# Patient Record
Sex: MALE | Race: WHITE | NOT HISPANIC OR LATINO | URBAN - METROPOLITAN AREA
[De-identification: names, ages, dates, MRNs, and addresses within clinical notes are randomized per-mention and may not be internally consistent; named-entity substitution may affect disease eponyms.]

---

## 2024-04-12 ENCOUNTER — HOSPITAL ENCOUNTER (INPATIENT)
Facility: HOSPITAL | Age: 59
LOS: 3 days | Discharge: HOME | DRG: 121 | End: 2024-04-15
Attending: EMERGENCY MEDICINE | Admitting: HOSPITALIST
Payer: MEDICAID

## 2024-04-12 DIAGNOSIS — R76.8 HEPATITIS C ANTIBODY POSITIVE IN BLOOD: ICD-10-CM

## 2024-04-12 DIAGNOSIS — R07.9 CHEST PAIN: ICD-10-CM

## 2024-04-12 DIAGNOSIS — H05.012 ORBITAL CELLULITIS ON LEFT: Primary | ICD-10-CM

## 2024-04-12 DIAGNOSIS — H05.012 ORBITAL ABSCESS, LEFT: ICD-10-CM

## 2024-04-12 PROBLEM — F17.210 CIGARETTE NICOTINE DEPENDENCE WITHOUT COMPLICATION: Status: ACTIVE | Noted: 2024-04-12

## 2024-04-12 PROBLEM — R79.89 ELEVATED LFTS: Status: ACTIVE | Noted: 2024-04-12

## 2024-04-12 LAB
ALBUMIN SERPL BCP-MCNC: 3.2 G/DL (ref 3.5–5.2)
ALP SERPL-CCNC: 53 U/L (ref 55–135)
ALT SERPL W/O P-5'-P-CCNC: 46 U/L (ref 10–44)
ANION GAP SERPL CALC-SCNC: 8 MMOL/L (ref 8–16)
AST SERPL-CCNC: 50 U/L (ref 10–40)
BASOPHILS # BLD AUTO: 0.07 K/UL (ref 0–0.2)
BASOPHILS NFR BLD: 0.6 % (ref 0–1.9)
BILIRUB SERPL-MCNC: 0.4 MG/DL (ref 0.1–1)
BUN SERPL-MCNC: 8 MG/DL (ref 6–20)
CALCIUM SERPL-MCNC: 9.1 MG/DL (ref 8.7–10.5)
CHLORIDE SERPL-SCNC: 109 MMOL/L (ref 95–110)
CHOLEST SERPL-MCNC: 159 MG/DL (ref 120–199)
CHOLEST/HDLC SERPL: 4.2 {RATIO} (ref 2–5)
CO2 SERPL-SCNC: 24 MMOL/L (ref 23–29)
CREAT SERPL-MCNC: 0.8 MG/DL (ref 0.5–1.4)
CRP SERPL-MCNC: 2.5 MG/L (ref 0–8.2)
DIFFERENTIAL METHOD BLD: ABNORMAL
EOSINOPHIL # BLD AUTO: 0.1 K/UL (ref 0–0.5)
EOSINOPHIL NFR BLD: 1.1 % (ref 0–8)
ERYTHROCYTE [DISTWIDTH] IN BLOOD BY AUTOMATED COUNT: 13.1 % (ref 11.5–14.5)
EST. GFR  (NO RACE VARIABLE): >60 ML/MIN/1.73 M^2
ESTIMATED AVG GLUCOSE: 108 MG/DL (ref 68–131)
GLUCOSE SERPL-MCNC: 122 MG/DL (ref 70–110)
HBA1C MFR BLD: 5.4 % (ref 4–5.6)
HCT VFR BLD AUTO: 42.8 % (ref 40–54)
HCV AB SERPL QL IA: REACTIVE
HDLC SERPL-MCNC: 38 MG/DL (ref 40–75)
HDLC SERPL: 23.9 % (ref 20–50)
HGB BLD-MCNC: 14.5 G/DL (ref 14–18)
HIV 1+2 AB+HIV1 P24 AG SERPL QL IA: NORMAL
IMM GRANULOCYTES # BLD AUTO: 0.02 K/UL (ref 0–0.04)
IMM GRANULOCYTES NFR BLD AUTO: 0.2 % (ref 0–0.5)
LDLC SERPL CALC-MCNC: 84 MG/DL (ref 63–159)
LYMPHOCYTES # BLD AUTO: 3.5 K/UL (ref 1–4.8)
LYMPHOCYTES NFR BLD: 29.9 % (ref 18–48)
MCH RBC QN AUTO: 32.7 PG (ref 27–31)
MCHC RBC AUTO-ENTMCNC: 33.9 G/DL (ref 32–36)
MCV RBC AUTO: 96 FL (ref 82–98)
MONOCYTES # BLD AUTO: 0.8 K/UL (ref 0.3–1)
MONOCYTES NFR BLD: 6.9 % (ref 4–15)
NEUTROPHILS # BLD AUTO: 7.2 K/UL (ref 1.8–7.7)
NEUTROPHILS NFR BLD: 61.3 % (ref 38–73)
NONHDLC SERPL-MCNC: 121 MG/DL
NRBC BLD-RTO: 0 /100 WBC
PLATELET # BLD AUTO: 249 K/UL (ref 150–450)
PMV BLD AUTO: 10.1 FL (ref 9.2–12.9)
POTASSIUM SERPL-SCNC: 3.8 MMOL/L (ref 3.5–5.1)
PROT SERPL-MCNC: 6.6 G/DL (ref 6–8.4)
RBC # BLD AUTO: 4.44 M/UL (ref 4.6–6.2)
SODIUM SERPL-SCNC: 141 MMOL/L (ref 136–145)
TRIGL SERPL-MCNC: 185 MG/DL (ref 30–150)
TSH SERPL DL<=0.005 MIU/L-ACNC: 0.58 UIU/ML (ref 0.4–4)
WBC # BLD AUTO: 11.82 K/UL (ref 3.9–12.7)

## 2024-04-12 PROCEDURE — 86803 HEPATITIS C AB TEST: CPT | Performed by: PHYSICIAN ASSISTANT

## 2024-04-12 PROCEDURE — 87522 HEPATITIS C REVRS TRNSCRPJ: CPT | Performed by: PHYSICIAN ASSISTANT

## 2024-04-12 PROCEDURE — 96374 THER/PROPH/DIAG INJ IV PUSH: CPT

## 2024-04-12 PROCEDURE — 99285 EMERGENCY DEPT VISIT HI MDM: CPT

## 2024-04-12 PROCEDURE — 84443 ASSAY THYROID STIM HORMONE: CPT | Performed by: PHYSICIAN ASSISTANT

## 2024-04-12 PROCEDURE — 12000002 HC ACUTE/MED SURGE SEMI-PRIVATE ROOM

## 2024-04-12 PROCEDURE — 83036 HEMOGLOBIN GLYCOSYLATED A1C: CPT | Performed by: PHYSICIAN ASSISTANT

## 2024-04-12 PROCEDURE — 87389 HIV-1 AG W/HIV-1&-2 AB AG IA: CPT | Performed by: PHYSICIAN ASSISTANT

## 2024-04-12 PROCEDURE — 85025 COMPLETE CBC W/AUTO DIFF WBC: CPT | Performed by: PHYSICIAN ASSISTANT

## 2024-04-12 PROCEDURE — 80061 LIPID PANEL: CPT | Performed by: PHYSICIAN ASSISTANT

## 2024-04-12 PROCEDURE — 25000003 PHARM REV CODE 250: Performed by: PHYSICIAN ASSISTANT

## 2024-04-12 PROCEDURE — 86140 C-REACTIVE PROTEIN: CPT | Performed by: PHYSICIAN ASSISTANT

## 2024-04-12 PROCEDURE — 63600175 PHARM REV CODE 636 W HCPCS: Performed by: PHYSICIAN ASSISTANT

## 2024-04-12 PROCEDURE — 80053 COMPREHEN METABOLIC PANEL: CPT | Performed by: PHYSICIAN ASSISTANT

## 2024-04-12 RX ORDER — SODIUM CHLORIDE 0.9 % (FLUSH) 0.9 %
10 SYRINGE (ML) INJECTION EVERY 12 HOURS PRN
Status: DISCONTINUED | OUTPATIENT
Start: 2024-04-12 | End: 2024-04-15 | Stop reason: HOSPADM

## 2024-04-12 RX ORDER — IBUPROFEN 200 MG
24 TABLET ORAL
Status: DISCONTINUED | OUTPATIENT
Start: 2024-04-12 | End: 2024-04-15 | Stop reason: HOSPADM

## 2024-04-12 RX ORDER — GLUCAGON 1 MG
1 KIT INJECTION
Status: DISCONTINUED | OUTPATIENT
Start: 2024-04-12 | End: 2024-04-15 | Stop reason: HOSPADM

## 2024-04-12 RX ORDER — ACETAMINOPHEN 325 MG/1
650 TABLET ORAL EVERY 6 HOURS PRN
Status: DISCONTINUED | OUTPATIENT
Start: 2024-04-12 | End: 2024-04-15 | Stop reason: HOSPADM

## 2024-04-12 RX ORDER — KETOROLAC TROMETHAMINE 30 MG/ML
10 INJECTION, SOLUTION INTRAMUSCULAR; INTRAVENOUS
Status: COMPLETED | OUTPATIENT
Start: 2024-04-12 | End: 2024-04-12

## 2024-04-12 RX ORDER — IBUPROFEN 200 MG
16 TABLET ORAL
Status: DISCONTINUED | OUTPATIENT
Start: 2024-04-12 | End: 2024-04-15 | Stop reason: HOSPADM

## 2024-04-12 RX ORDER — TALC
6 POWDER (GRAM) TOPICAL NIGHTLY PRN
Status: DISCONTINUED | OUTPATIENT
Start: 2024-04-12 | End: 2024-04-15 | Stop reason: HOSPADM

## 2024-04-12 RX ORDER — PROPARACAINE HYDROCHLORIDE 5 MG/ML
1 SOLUTION/ DROPS OPHTHALMIC
Status: COMPLETED | OUTPATIENT
Start: 2024-04-12 | End: 2024-04-12

## 2024-04-12 RX ORDER — ONDANSETRON HYDROCHLORIDE 2 MG/ML
4 INJECTION, SOLUTION INTRAVENOUS EVERY 8 HOURS PRN
Status: DISCONTINUED | OUTPATIENT
Start: 2024-04-12 | End: 2024-04-15 | Stop reason: HOSPADM

## 2024-04-12 RX ORDER — NALOXONE HCL 0.4 MG/ML
0.02 VIAL (ML) INJECTION
Status: DISCONTINUED | OUTPATIENT
Start: 2024-04-12 | End: 2024-04-15 | Stop reason: HOSPADM

## 2024-04-12 RX ORDER — SODIUM CHLORIDE 0.9 % (FLUSH) 0.9 %
10 SYRINGE (ML) INJECTION
Status: DISCONTINUED | OUTPATIENT
Start: 2024-04-12 | End: 2024-04-15 | Stop reason: HOSPADM

## 2024-04-12 RX ADMIN — KETOROLAC TROMETHAMINE 10 MG: 30 INJECTION, SOLUTION INTRAMUSCULAR; INTRAVENOUS at 08:04

## 2024-04-12 RX ADMIN — PROPARACAINE HYDROCHLORIDE 1 DROP: 5 SOLUTION/ DROPS OPHTHALMIC at 09:04

## 2024-04-13 LAB
ALBUMIN SERPL BCP-MCNC: 3.1 G/DL (ref 3.5–5.2)
ALP SERPL-CCNC: 51 U/L (ref 55–135)
ALT SERPL W/O P-5'-P-CCNC: 45 U/L (ref 10–44)
ANION GAP SERPL CALC-SCNC: 8 MMOL/L (ref 8–16)
AST SERPL-CCNC: 47 U/L (ref 10–40)
BASOPHILS # BLD AUTO: 0.06 K/UL (ref 0–0.2)
BASOPHILS NFR BLD: 0.6 % (ref 0–1.9)
BILIRUB SERPL-MCNC: 0.7 MG/DL (ref 0.1–1)
BILIRUB UR QL STRIP: NEGATIVE
BUN SERPL-MCNC: 8 MG/DL (ref 6–20)
CALCIUM SERPL-MCNC: 8.9 MG/DL (ref 8.7–10.5)
CHLORIDE SERPL-SCNC: 108 MMOL/L (ref 95–110)
CLARITY UR REFRACT.AUTO: CLEAR
CO2 SERPL-SCNC: 23 MMOL/L (ref 23–29)
COLOR UR AUTO: YELLOW
CREAT SERPL-MCNC: 0.8 MG/DL (ref 0.5–1.4)
DIFFERENTIAL METHOD BLD: ABNORMAL
EOSINOPHIL # BLD AUTO: 0.1 K/UL (ref 0–0.5)
EOSINOPHIL NFR BLD: 1.4 % (ref 0–8)
ERYTHROCYTE [DISTWIDTH] IN BLOOD BY AUTOMATED COUNT: 13.1 % (ref 11.5–14.5)
ERYTHROCYTE [SEDIMENTATION RATE] IN BLOOD BY PHOTOMETRIC METHOD: 21 MM/HR (ref 0–23)
EST. GFR  (NO RACE VARIABLE): >60 ML/MIN/1.73 M^2
GLUCOSE SERPL-MCNC: 76 MG/DL (ref 70–110)
GLUCOSE UR QL STRIP: NEGATIVE
HCT VFR BLD AUTO: 42.6 % (ref 40–54)
HGB BLD-MCNC: 14 G/DL (ref 14–18)
HGB UR QL STRIP: NEGATIVE
IMM GRANULOCYTES # BLD AUTO: 0.04 K/UL (ref 0–0.04)
IMM GRANULOCYTES NFR BLD AUTO: 0.4 % (ref 0–0.5)
KETONES UR QL STRIP: NEGATIVE
LEUKOCYTE ESTERASE UR QL STRIP: NEGATIVE
LYMPHOCYTES # BLD AUTO: 3.6 K/UL (ref 1–4.8)
LYMPHOCYTES NFR BLD: 36.3 % (ref 18–48)
MAGNESIUM SERPL-MCNC: 1.8 MG/DL (ref 1.6–2.6)
MCH RBC QN AUTO: 32.3 PG (ref 27–31)
MCHC RBC AUTO-ENTMCNC: 32.9 G/DL (ref 32–36)
MCV RBC AUTO: 98 FL (ref 82–98)
MONOCYTES # BLD AUTO: 0.8 K/UL (ref 0.3–1)
MONOCYTES NFR BLD: 8.3 % (ref 4–15)
NEUTROPHILS # BLD AUTO: 5.2 K/UL (ref 1.8–7.7)
NEUTROPHILS NFR BLD: 53 % (ref 38–73)
NITRITE UR QL STRIP: NEGATIVE
NRBC BLD-RTO: 0 /100 WBC
PH UR STRIP: 8 [PH] (ref 5–8)
PLATELET # BLD AUTO: 227 K/UL (ref 150–450)
PMV BLD AUTO: 10.5 FL (ref 9.2–12.9)
POTASSIUM SERPL-SCNC: 3.7 MMOL/L (ref 3.5–5.1)
PROCALCITONIN SERPL IA-MCNC: <0.02 NG/ML
PROT SERPL-MCNC: 6.3 G/DL (ref 6–8.4)
PROT UR QL STRIP: NEGATIVE
RBC # BLD AUTO: 4.34 M/UL (ref 4.6–6.2)
SODIUM SERPL-SCNC: 139 MMOL/L (ref 136–145)
SP GR UR STRIP: 1.02 (ref 1–1.03)
URN SPEC COLLECT METH UR: NORMAL
WBC # BLD AUTO: 9.88 K/UL (ref 3.9–12.7)

## 2024-04-13 PROCEDURE — 63600175 PHARM REV CODE 636 W HCPCS: Performed by: HOSPITALIST

## 2024-04-13 PROCEDURE — 63600175 PHARM REV CODE 636 W HCPCS: Performed by: INTERNAL MEDICINE

## 2024-04-13 PROCEDURE — 21400001 HC TELEMETRY ROOM

## 2024-04-13 PROCEDURE — 25000003 PHARM REV CODE 250: Performed by: NURSE PRACTITIONER

## 2024-04-13 PROCEDURE — 83735 ASSAY OF MAGNESIUM: CPT | Performed by: NURSE PRACTITIONER

## 2024-04-13 PROCEDURE — 84145 PROCALCITONIN (PCT): CPT | Performed by: NURSE PRACTITIONER

## 2024-04-13 PROCEDURE — 25000003 PHARM REV CODE 250: Performed by: INTERNAL MEDICINE

## 2024-04-13 PROCEDURE — 11000001 HC ACUTE MED/SURG PRIVATE ROOM

## 2024-04-13 PROCEDURE — 80053 COMPREHEN METABOLIC PANEL: CPT | Performed by: NURSE PRACTITIONER

## 2024-04-13 PROCEDURE — 63600175 PHARM REV CODE 636 W HCPCS: Performed by: NURSE PRACTITIONER

## 2024-04-13 PROCEDURE — 36415 COLL VENOUS BLD VENIPUNCTURE: CPT | Performed by: NURSE PRACTITIONER

## 2024-04-13 PROCEDURE — 81003 URINALYSIS AUTO W/O SCOPE: CPT | Performed by: NURSE PRACTITIONER

## 2024-04-13 PROCEDURE — 85025 COMPLETE CBC W/AUTO DIFF WBC: CPT | Performed by: NURSE PRACTITIONER

## 2024-04-13 PROCEDURE — 85652 RBC SED RATE AUTOMATED: CPT | Performed by: NURSE PRACTITIONER

## 2024-04-13 PROCEDURE — 25000003 PHARM REV CODE 250: Performed by: HOSPITALIST

## 2024-04-13 RX ADMIN — AMPICILLIN SODIUM, SULBACTAM SODIUM 3 G: 2; 1 INJECTION, POWDER, FOR SOLUTION INTRAMUSCULAR; INTRAVENOUS at 12:04

## 2024-04-13 RX ADMIN — VANCOMYCIN HYDROCHLORIDE 1250 MG: 1.25 INJECTION, POWDER, LYOPHILIZED, FOR SOLUTION INTRAVENOUS at 05:04

## 2024-04-13 RX ADMIN — AMPICILLIN SODIUM AND SULBACTAM SODIUM 3 G: 2; 1 INJECTION, POWDER, FOR SOLUTION INTRAMUSCULAR; INTRAVENOUS at 01:04

## 2024-04-13 RX ADMIN — IOHEXOL 75 ML: 350 INJECTION, SOLUTION INTRAVENOUS at 11:04

## 2024-04-13 RX ADMIN — SODIUM CHLORIDE 500 ML: 9 INJECTION, SOLUTION INTRAVENOUS at 12:04

## 2024-04-13 RX ADMIN — VANCOMYCIN HYDROCHLORIDE 1250 MG: 1.25 INJECTION, POWDER, LYOPHILIZED, FOR SOLUTION INTRAVENOUS at 07:04

## 2024-04-13 NOTE — HPI
Adin Copeland is a 58 y.o. male with a PMHx of nicotine dependence who presents to the ED as a transfer from [facility] for ophthalmology evaluation for orbital cellulitis with abscess. Labs largely unremarkable CT orbits w/ contrast revealed findings consistent with left orbital cellulitis with a 10 x 4 mm fluid collection anterior to the left lobe suspicious for an abscess. There is no post septal extension. The patient received 1.5g vancomycin and 2g Rocephin prior to transfer. The patient reports he noted mild upper eyelid pain 3 days ago. He took ASA and went to bed. He reports the next morning he noted left eye redness, swelling, and drainage. He reports it worsened today. Denies any trauam to the eye. He denies vision changes, headache, fever, or chills. He denies SOB, CP, N/V/D, abdominal pain, or dysuria. The patient endorses chronic smoker's cough that is unchanged from baseline.    In the ED, VSS, afebrile. CBC unremarkable. Glucose 122. Albumin 3.2. AST/ALT 50/46. CRP 2.5. Hep C Ab reactive. The patient received Toradol. Ophthalmology evaluated the patient and recommended vancomycin and unasyn, repeat CT orbits w/wo contrast tomorrow, and ENT evaluation for possible abscess drainage.

## 2024-04-13 NOTE — CONSULTS
Consultation Report  Ophthalmology Service    Date: 04/12/2024    Chief complaint/Reason for Consult: maisha-orbital cellulitis     History of Present Illness: Adin Copeland is a 58 y.o. male who presents with 3 days of worsening left eye swelling. Started 2 days ago, and has gotten progressively worse. Minimal pain, and minimal tenderness. Denies vision changes, changes with extra-ocular movements, diplopia, fevers chills. Presented to [facility] because was worried about swelling.    POcularHx: No history of ocular problems or past ocular surgeries.  Does not use glasses or contacts.    Current eye gtts: none      PMHx:  has no past medical history on file.     PSurgHx:  has no past surgical history on file.     Home Medications:   Prior to Admission medications    Not on File        Medications this encounter:     Allergies: has No Known Allergies.     Social:       Family Hx: No family history of glaucoma, macular degeneration, or blindness. family history is not on file.     ROS: see hpi     Ocular examination/Dilated fundus examination:  Base Eye Exam       Visual Acuity (Snellen - Linear)         Right Left    Dist sc 20/40 20/20              Tonometry (Tonopen, 10:15 PM)         Right Left    Pressure 14 15              Pupils         Pupils Dark Light Shape React APD    Right PERRL 4 2 Round Brisk None    Left PERRL 4 2 Round Brisk None              Visual Fields         Right Left     Full Full              Extraocular Movement         Right Left     Full, Ortho Full, Ortho              Dilation       Both eyes: 1% Mydriacyl, 2.5% Phenylephrine @ 10:16 PM                  Slit Lamp and Fundus Exam       External Exam         Right Left    External Normal periorbital swelling, no fluctuant areas, no proptosis              Slit Lamp Exam         Right Left    Lids/Lashes Normal upper and lower lid swelling    Conjunctiva/Sclera White and quiet 2+ inferior and temporal serous chemosis    Cornea Clear Clear     Anterior Chamber Deep and quiet Deep and quiet    Iris Round and reactive Round and reactive    Lens Clear Clear    Anterior Vitreous Normal Normal              Fundus Exam         Right Left    Disc Normal Normal    C/D Ratio .2 .2    Macula Normal Normal    Vessels Normal Normal    Periphery Normal Normal                            =======================================    Assessment/Plan:   1. Maisha-orbital cellulitis with concern for lateral orbital abscess  - presents with three days of worsening left maisha-orbital swelling  - minimal pain, no proptosis, EOM full, normal vision and IOP, no rAPD  - CT scan from outside hospital concerning for abscess lateral to globe, but no read sent with scan  - no fluctuance felt at bedside, unable to access abscess at bedside  - recommend repeat CT orbits w and w/o contrast tomorrow morning  - recommend admit to medicine for IV vancomycin and unasyn  - no oculo-plastics staff available at this time to drain abscess, recommend ENT consult in the morning for possible abscess drainage  - ophthalmology will continue to follow    Discussed patient's history, physical, assessment and plan with Dr. Paz.  If there are further questions, please page the on call ophthalmology resident.    Franco Evangelista MD  PGY-2, Ophthalmology  04/12/2024  10:16 PM

## 2024-04-13 NOTE — ED TRIAGE NOTES
Pt presents to ED from [facility] for optho consult. Pt arrives with abscess to left eye x3 days. Pt denies pain or vision changes at this time.

## 2024-04-13 NOTE — NURSING
Patient resting in bed, AAOx4, no complaints of pain, respirations even and unlabored. Plan of care and medications discussed with patient; patient verbalized understanding. Care ongoing.

## 2024-04-13 NOTE — ASSESSMENT & PLAN NOTE
-Afebrile, no leukocytosis.  -CRP 2.5.  -CT orbits w/ contrast at OSH reveals findings consistent with left orbital cellulitis with a 10 x 4 mm fluid collection anterior to the left lobe suspicious for an abscess. There is no post septal extension.   -The patient received vancomycin and rocephin prior to transfer, will change to unasyn and vancomycin per ophthalmology recs.  -Repeat CT orbits w/ contrast tomorrow per ophthalmology recs.  -ENT consulted per ophthalmology recs as there is no oculo-plastics staff available at this time to drain abscess, appreciate assistance.  -NPO after midnight pending ENT evaluation.  -PRN pain control.

## 2024-04-13 NOTE — PROGRESS NOTES
[facility] ([address] Medicine  Progress Note    Patient Name: Adin Copeland  MRN: [id number]  Patient Class: IP- Inpatient   Admission Date: 4/12/2024  Length of Stay: 1 days  Attending Physician: Tereso Patel MD  Primary Care Provider: Delia, Primary Doctor        Subjective:     Principal Problem:Orbital cellulitis on left        HPI:  Adin Copeland is a 58 y.o. male with a PMHx of nicotine dependence who presents to the ED as a transfer from [facility] for ophthalmology evaluation for orbital cellulitis with abscess. Labs largely unremarkable CT orbits w/ contrast revealed findings consistent with left orbital cellulitis with a 10 x 4 mm fluid collection anterior to the left lobe suspicious for an abscess. There is no post septal extension. The patient received 1.5g vancomycin and 2g Rocephin prior to transfer. The patient reports he noted mild upper eyelid pain 3 days ago. He took ASA and went to bed. He reports the next morning he noted left eye redness, swelling, and drainage. He reports it worsened today. Denies any trauam to the eye. He denies vision changes, headache, fever, or chills. He denies SOB, CP, N/V/D, abdominal pain, or dysuria. The patient endorses chronic smoker's cough that is unchanged from baseline.    In the ED, VSS, afebrile. CBC unremarkable. Glucose 122. Albumin 3.2. AST/ALT 50/46. CRP 2.5. Hep C Ab reactive. The patient received Toradol. Ophthalmology evaluated the patient and recommended vancomycin and unasyn, repeat CT orbits w/wo contrast tomorrow, and ENT evaluation for possible abscess drainage.    Overview/Hospital Course:  No notes on file    Interval History: Cellulitis much improved, probable dc to home in am..    Review of Systems  Objective:     Vital Signs (Most Recent):  Temp: 98.5 °F (36.9 °C) (04/13/24 0815)  Pulse: (!) 53 (04/13/24 0815)  Resp: 16 (04/13/24 0815)  BP: 116/72 (04/13/24 0815)  SpO2: 95 % (04/13/24 0815) Vital Signs (24h Range):  Temp:   [97.6 °F (36.4 °C)-98.5 °F (36.9 °C)] 98.5 °F (36.9 °C)  Pulse:  [53-65] 53  Resp:  [14-23] 16  SpO2:  [95 %-98 %] 95 %  BP: (116-148)/(72-90) 116/72     Weight: 78.2 kg (172 lb 6.4 oz)  Body mass index is 21.55 kg/m².    Intake/Output Summary (Last 24 hours) at 4/13/2024 1008  Last data filed at 4/13/2024 0546  Gross per 24 hour   Intake 599 ml   Output --   Net 599 ml         Physical Exam  Vitals and nursing note reviewed.   Constitutional:       General: He is not in acute distress.     Appearance: He is not toxic-appearing or diaphoretic.   HENT:      Head: Normocephalic and atraumatic. Left periorbital erythema present.      Nose: Nose normal.      Mouth/Throat:      Mouth: Mucous membranes are moist.   Eyes:      General:         Left eye: Discharge present.     Extraocular Movements: Extraocular movements intact.      Conjunctiva/sclera:      Left eye: Left conjunctiva is injected. Chemosis present.      Pupils: Pupils are equal, round, and reactive to light.      Comments: Erythema and swelling of the upper and lower lids with chemosis. No significant pain with extraocular movements. See photos   Cardiovascular:      Rate and Rhythm: Normal rate and regular rhythm.      Pulses: Normal pulses.   Pulmonary:      Effort: Pulmonary effort is normal. No respiratory distress.      Breath sounds: No wheezing, rhonchi or rales.   Abdominal:      General: Bowel sounds are normal. There is no distension.      Palpations: Abdomen is soft.      Tenderness: There is no abdominal tenderness. There is no guarding.   Musculoskeletal:         General: Normal range of motion.      Cervical back: Normal range of motion.   Skin:     General: Skin is warm and dry.      Capillary Refill: Capillary refill takes less than 2 seconds.   Neurological:      General: No focal deficit present.      Mental Status: He is alert and oriented to person, place, and time.      Sensory: No sensory deficit.      Motor: No weakness.    Psychiatric:         Mood and Affect: Mood normal.         Behavior: Behavior normal.             Significant Labs: All pertinent labs within the past 24 hours have been reviewed.  BMP:   Recent Labs   Lab 04/13/24  0419   GLU 76      K 3.7      CO2 23   BUN 8   CREATININE 0.8   CALCIUM 8.9   MG 1.8       Significant Imaging: I have reviewed all pertinent imaging results/findings within the past 24 hours.    Assessment/Plan:      * Orbital cellulitis on left  -Afebrile, no leukocytosis.  -CRP 2.5.  -CT orbits w/ contrast at OSH reveals findings consistent with left orbital cellulitis with a 10 x 4 mm fluid collection anterior to the left lobe suspicious for an abscess. There is no post septal extension.   -The patient received vancomycin and rocephin prior to transfer, will change to unasyn and vancomycin per ophthalmology recs.  -Repeat CT orbits w/ contrast tomorrow per ophthalmology recs.  -ENT consulted per ophthalmology recs as there is no oculo-plastics staff available at this time to drain abscess, appreciate assistance.  -NPO after midnight pending ENT evaluation.  -PRN pain control.    Elevated LFTs  -AST/ALT 50/46 on admit, Tbili 0.4.  -Hep C Ab reactive.   -RUQ abd US pending.  -Continue to monitor on daily labs.    Hepatitis C antibody test positive  -Hep C Ab reactive on admit, Quantitative PCR in process.  -Pending results patient may need outpatient hepatology follow up, which I discussed with the patient.    Cigarette nicotine dependence without complication  Dangers of cigarette smoking were reviewed with patient in detail. Patient was Counseled for 3-10 minutes. Nicotine replacement options were discussed. Nicotine replacement was discussed- not prescribed per patient's request      VTE Risk Mitigation (From admission, onward)           Ordered     IP VTE LOW RISK PATIENT  Once         04/13/24 0247     Place sequential compression device  Until discontinued         04/12/24 7711                     Discharge Planning   CLYDE:      Code Status: Full Code   Is the patient medically ready for discharge?:     Reason for patient still in hospital (select all that apply): Patient unstable                     Tereso Patel MD  Department of Hospital Medicine   [facility] ([place])

## 2024-04-13 NOTE — CONSULTS
[facility] ([place] [address])  Otorhinolaryngology-Head & Neck Surgery  Consult Note    Patient Name: Adin Copeland  MRN: [id number]  Code Status: Full Code  Admission Date: 4/12/2024  Hospital Length of Stay: 1 days  Attending Physician: Tereso Patel MD  Primary Care Provider: No primary care provider on file.    Patient information was obtained from patient, past medical records, and ER records.     Inpatient consult to ENT  Consult performed by: Dyllan Watts MD  Consult ordered by: Marysol Packer NP        Subjective:     Chief Complaint/Reason for Admission: left periorbital edema    History of Present Illness: 58M presented to [facility] ED last night with 3 days of progressive left eye swelling. CT scan was performed with showed periorbital cellulitis and possible small lateral orbital abscess. Per chart review, the ED discussed the case with ophthalmology team at [facility], who accepted ED to ED transfer for ophthalmology evaluation. Ophthalmology assessed the patient last night, recommended repeat scan and unasyn/vanc. Patient is admitted to the  service. WBC normal.     On my exam this morning, patient reports improvement of left periorbital edema and pain. Denies vision changes. Denies rhinorrhea. Denies trauma to left eye or bug bites, etc. However, he does have cats in his apartment, so possible he got scratched while sleeping.     Medications:  Continuous Infusions:  Current Facility-Administered Medications   Medication Dose Route Frequency Provider Last Rate Last Admin    acetaminophen tablet 650 mg  650 mg Oral Q6H PRN Marysol Packer NP        ampicillin-sulbactam (UNASYN) 3 g in sodium chloride 0.9 % 100 mL IVPB (MB+)  3 g Intravenous Q8H Marysol Packer NP   Stopped at 04/13/24 0101    dextrose 10% bolus 125 mL 125 mL  12.5 g Intravenous PRN Marysol Packer NP        dextrose 10% bolus 250 mL 250 mL  25 g Intravenous PRN Marysol Packer NP        glucagon (human  recombinant) injection 1 mg  1 mg Intramuscular PRN Marysol Packer, NP        glucose chewable tablet 16 g  16 g Oral PRN Marysol Packer, NP        glucose chewable tablet 24 g  24 g Oral PRN Marysol Packer, NP        melatonin tablet 6 mg  6 mg Oral Nightly PRN Carosone-Link, Mary, PA-C        naloxone 0.4 mg/mL injection 0.02 mg  0.02 mg Intravenous PRN Marysol Packer, ROMERO        ondansetron injection 4 mg  4 mg Intravenous Q8H PRN Marysol Packer, NP        sodium chloride 0.9% flush 10 mL  10 mL Intravenous PRN Carosone-Link, Mary, PA-C        sodium chloride 0.9% flush 10 mL  10 mL Intravenous Q12H PRN Marysol Packer, ROMERO        vancomycin - pharmacy to dose   Intravenous pharmacy to manage frequency Marysol Packer NP        vancomycin 1,250 mg in dextrose 5 % (D5W) 250 mL IVPB (Vial-Mate)  15 mg/kg Intravenous Q12H Ramon Paz MD         Scheduled Meds:  Current Facility-Administered Medications   Medication Dose Route Frequency Provider Last Rate Last Admin    acetaminophen tablet 650 mg  650 mg Oral Q6H PRN Marysol Packer NP        ampicillin-sulbactam (UNASYN) 3 g in sodium chloride 0.9 % 100 mL IVPB (MB+)  3 g Intravenous Q8H Marysol Packer NP   Stopped at 04/13/24 0101    dextrose 10% bolus 125 mL 125 mL  12.5 g Intravenous PRN Marysol Packer NP        dextrose 10% bolus 250 mL 250 mL  25 g Intravenous PRN Marysol Packer, NP        glucagon (human recombinant) injection 1 mg  1 mg Intramuscular PRN Marysol Packer, NP        glucose chewable tablet 16 g  16 g Oral PRN Marysol Packer, NP        glucose chewable tablet 24 g  24 g Oral PRN Marysol Packer, NP        melatonin tablet 6 mg  6 mg Oral Nightly PRN Carosone-Link, Mary, PA-C        naloxone 0.4 mg/mL injection 0.02 mg  0.02 mg Intravenous PRN Marysol Packer, ROMERO        ondansetron injection 4 mg  4 mg Intravenous Q8H PRN Marysol Packer, NP        sodium chloride 0.9% flush 10 mL   10 mL Intravenous PRN Carosone-Link, Mary, PA-C        sodium chloride 0.9% flush 10 mL  10 mL Intravenous Q12H PRN Marysol Packer NP        vancomycin - pharmacy to dose   Intravenous pharmacy to manage frequency Marysol Packer NP        vancomycin 1,250 mg in dextrose 5 % (D5W) 250 mL IVPB (Vial-Mate)  15 mg/kg Intravenous Q12H Ramon Paz MD         PRN Meds:  Current Facility-Administered Medications   Medication Dose Route Frequency Provider Last Rate Last Admin    acetaminophen tablet 650 mg  650 mg Oral Q6H PRN Marysol Packer NP        ampicillin-sulbactam (UNASYN) 3 g in sodium chloride 0.9 % 100 mL IVPB (MB+)  3 g Intravenous Q8H Marysol Packer NP   Stopped at 04/13/24 0101    dextrose 10% bolus 125 mL 125 mL  12.5 g Intravenous PRN Marysol Packer NP        dextrose 10% bolus 250 mL 250 mL  25 g Intravenous PRN Marysol Packer NP        glucagon (human recombinant) injection 1 mg  1 mg Intramuscular PRN Marysol Packer NP        glucose chewable tablet 16 g  16 g Oral PRN Marysol Packer NP        glucose chewable tablet 24 g  24 g Oral PRN Marysol Packer NP        melatonin tablet 6 mg  6 mg Oral Nightly PRN Carosone-Link, Mary, PA-C        naloxone 0.4 mg/mL injection 0.02 mg  0.02 mg Intravenous PRN Marysol Packer NP        ondansetron injection 4 mg  4 mg Intravenous Q8H PRN Marysol Packer NP        sodium chloride 0.9% flush 10 mL  10 mL Intravenous PRN Carosone-Link, Mary, PA-C        sodium chloride 0.9% flush 10 mL  10 mL Intravenous Q12H PRN Marysol Packer NP        vancomycin - pharmacy to dose   Intravenous pharmacy to manage frequency Marysol Packer NP        vancomycin 1,250 mg in dextrose 5 % (D5W) 250 mL IVPB (Vial-Mate)  15 mg/kg Intravenous Q12H Ramon Paz MD            Current Facility-Administered Medications   Medication Dose Route Frequency Provider Last Rate Last Admin    acetaminophen tablet 650 mg  650 mg  Oral Q6H PRN Marysol Packer NP        ampicillin-sulbactam (UNASYN) 3 g in sodium chloride 0.9 % 100 mL IVPB (MB+)  3 g Intravenous Q8H Marysol Packer NP   Stopped at 04/13/24 0101    dextrose 10% bolus 125 mL 125 mL  12.5 g Intravenous PRN Marysol Packer NP        dextrose 10% bolus 250 mL 250 mL  25 g Intravenous PRN Marysol Packer NP        glucagon (human recombinant) injection 1 mg  1 mg Intramuscular PRN Marysol Packer NP        glucose chewable tablet 16 g  16 g Oral PRN Marysol Packer NP        glucose chewable tablet 24 g  24 g Oral PRN Marysol Packer NP        melatonin tablet 6 mg  6 mg Oral Nightly PRN Carosone-LinkMary PA-C        naloxone 0.4 mg/mL injection 0.02 mg  0.02 mg Intravenous PRN Marysol Packer NP        ondansetron injection 4 mg  4 mg Intravenous Q8H PRN Marysol Packer NP        sodium chloride 0.9% flush 10 mL  10 mL Intravenous PRN Carosone-Link, Mary PA-C        sodium chloride 0.9% flush 10 mL  10 mL Intravenous Q12H PRN Marysol Packer NP        vancomycin - pharmacy to dose   Intravenous pharmacy to manage frequency Marysol Packer NP        vancomycin 1,250 mg in dextrose 5 % (D5W) 250 mL IVPB (Vial-Mate)  15 mg/kg Intravenous Q12H Ramon Paz MD           Review of patient's allergies indicates:  No Known Allergies    No past medical history on file.  Past Surgical History:   Procedure Laterality Date    HERNIA REPAIR      TONSILLECTOMY       Family History    None       Tobacco Use    Smoking status: Every Day     Current packs/day: 1.00     Average packs/day: 1 pack/day for 42.3 years (42.3 ttl pk-yrs)     Types: Cigarettes     Start date: 1982    Smokeless tobacco: Not on file   Substance and Sexual Activity    Alcohol use: Not Currently    Drug use: Never    Sexual activity: Not on file     Review of Systems  Objective:     Vital Signs (Most Recent):  Temp: 98 °F (36.7 °C) (04/13/24 0517)  Pulse: (!) 57 (04/13/24  0517)  Resp: 19 (04/13/24 0517)  BP: 126/79 (04/13/24 0517)  SpO2: 96 % (04/13/24 0517) Vital Signs (24h Range):  Temp:  [97.6 °F (36.4 °C)-98.3 °F (36.8 °C)] 98 °F (36.7 °C)  Pulse:  [57-65] 57  Resp:  [14-23] 19  SpO2:  [95 %-98 %] 96 %  BP: (120-148)/(79-90) 126/79     Weight: 78.2 kg (172 lb 6.4 oz)  Body mass index is 21.55 kg/m².         Physical Exam     NAD  Awake and alert  Head atraumatic   Auricles WNL AU  Eyes  -Right: No edema, EOMI, VA grossly intact, pupil reactive  -Left: Upper/lower lid edema, periorbital edema and erythema, minimally TTP, no palpable fluctuance, inferior and lateral chemosis, EOMI  Nose w/ normal external appearance, no rhinorrhea  MMM, anterior tongue mobile, FOM soft, OP patent w/ midline uvula   Neck soft, not TTP, normal ROM, no LAD  Normal WOB, no stridor or stertor    Significant Labs:  CBC:   Recent Labs   Lab 04/13/24  0419   WBC 9.88   RBC 4.34*   HGB 14.0   HCT 42.6      MCV 98   MCH 32.3*   MCHC 32.9       Significant Diagnostics:  CT: I have reviewed all pertinent results/findings within the past 24 hours and my personal findings are:  I reviewed images from [facility] EMR - left orbital cellulitis with a small abscess that is antererio/medial to the lateral canthus    Assessment/Plan:     * Orbital cellulitis on left  58M with left orbital cellulitis and small abscess just medial to lateral canthus based on images from outside system. Patient reports improvement with antibiotics and edema appears decreased based on photos in chart. Denies vision changes.     -no acute ENT intervention at this time  -ok for diet  -Abx per ophtho  -Fu repeat scan      VTE Risk Mitigation (From admission, onward)           Ordered     IP VTE LOW RISK PATIENT  Once         04/13/24 0247     Place sequential compression device  Until discontinued         04/12/24 6799                    Thank you for your consult. I will follow-up with patient. Please contact us if you have any additional  questions.    Dyllan Watts MD  Otorhinolaryngology-Head & Neck Surgery  [facility] ([place])

## 2024-04-13 NOTE — ASSESSMENT & PLAN NOTE
-AST/ALT 50/46 on admit, Tbili 0.4.  -Hep C Ab reactive.   -RUQ abd US pending.  -Continue to monitor on daily labs.

## 2024-04-13 NOTE — PROGRESS NOTES
Pharmacokinetic Initial Assessment: IV Vancomycin    Assessment/Plan:    Patient received intravenous vancomycin with loading dose of 1500 mg once at transferring hospital. Will be followed by a maintenance dose of vancomycin 1250 mg IV every 12 hours.    Desired empiric serum trough concentration is 10 to 20 mcg/mL.    Draw vancomycin trough level 60 min prior to fourth dose on 04/14/24 at approximately 05:00    Pharmacy will continue to follow and monitor vancomycin.      Please contact pharmacy at extension [phone] with any questions regarding this assessment.     Thank you for the consult,   Lui Staley       Patient brief summary:  Adin Copeland is a 58 y.o. male initiated on antimicrobial therapy with IV Vancomycin for treatment of suspected skin & soft tissue infection    Drug Allergies:   Review of patient's allergies indicates:  No Known Allergies    Actual Body Weight:   77.1 kg    Renal Function:   CrCl cannot be calculated (Unknown ideal weight.).,     Dialysis Method (if applicable):  N/A

## 2024-04-13 NOTE — SUBJECTIVE & OBJECTIVE
Interval History: Cellulitis much improved, probable dc to home in am..    Review of Systems  Objective:     Vital Signs (Most Recent):  Temp: 98.5 °F (36.9 °C) (04/13/24 0815)  Pulse: (!) 53 (04/13/24 0815)  Resp: 16 (04/13/24 0815)  BP: 116/72 (04/13/24 0815)  SpO2: 95 % (04/13/24 0815) Vital Signs (24h Range):  Temp:  [97.6 °F (36.4 °C)-98.5 °F (36.9 °C)] 98.5 °F (36.9 °C)  Pulse:  [53-65] 53  Resp:  [14-23] 16  SpO2:  [95 %-98 %] 95 %  BP: (116-148)/(72-90) 116/72     Weight: 78.2 kg (172 lb 6.4 oz)  Body mass index is 21.55 kg/m².    Intake/Output Summary (Last 24 hours) at 4/13/2024 1008  Last data filed at 4/13/2024 0546  Gross per 24 hour   Intake 599 ml   Output --   Net 599 ml         Physical Exam  Vitals and nursing note reviewed.   Constitutional:       General: He is not in acute distress.     Appearance: He is not toxic-appearing or diaphoretic.   HENT:      Head: Normocephalic and atraumatic. Left periorbital erythema present.      Nose: Nose normal.      Mouth/Throat:      Mouth: Mucous membranes are moist.   Eyes:      General:         Left eye: Discharge present.     Extraocular Movements: Extraocular movements intact.      Conjunctiva/sclera:      Left eye: Left conjunctiva is injected. Chemosis present.      Pupils: Pupils are equal, round, and reactive to light.      Comments: Erythema and swelling of the upper and lower lids with chemosis. No significant pain with extraocular movements. See photos   Cardiovascular:      Rate and Rhythm: Normal rate and regular rhythm.      Pulses: Normal pulses.   Pulmonary:      Effort: Pulmonary effort is normal. No respiratory distress.      Breath sounds: No wheezing, rhonchi or rales.   Abdominal:      General: Bowel sounds are normal. There is no distension.      Palpations: Abdomen is soft.      Tenderness: There is no abdominal tenderness. There is no guarding.   Musculoskeletal:         General: Normal range of motion.      Cervical back: Normal range  of motion.   Skin:     General: Skin is warm and dry.      Capillary Refill: Capillary refill takes less than 2 seconds.   Neurological:      General: No focal deficit present.      Mental Status: He is alert and oriented to person, place, and time.      Sensory: No sensory deficit.      Motor: No weakness.   Psychiatric:         Mood and Affect: Mood normal.         Behavior: Behavior normal.             Significant Labs: All pertinent labs within the past 24 hours have been reviewed.  BMP:   Recent Labs   Lab 04/13/24  0419   GLU 76      K 3.7      CO2 23   BUN 8   CREATININE 0.8   CALCIUM 8.9   MG 1.8       Significant Imaging: I have reviewed all pertinent imaging results/findings within the past 24 hours.

## 2024-04-13 NOTE — PROGRESS NOTES
progress Report  Ophthalmology Service    Date: 04/13/2024    Chief complaint/Reason for Consult: maisha-orbital cellulitis     History of Present Illness: Adin Copeland is a 58 y.o. male who presents with 3 days of worsening left eye swelling. Started 2 days ago, and has gotten progressively worse. Minimal pain, and minimal tenderness. Denies vision changes, changes with extra-ocular movements, diplopia, fevers chills. Presented to [facility] ER because was worried about swelling.    Interval History:  4/13/24: improved swelling per patient. No pain, no vision changes.     POcularHx: No history of ocular problems or past ocular surgeries.  Does not use glasses or contacts.    Current eye gtts: none      PMHx:  has no past medical history on file.     PSurgHx:  has a past surgical history that includes Tonsillectomy and Hernia repair.     Home Medications:   Prior to Admission medications    Not on File        Medications this encounter:     Allergies: has No Known Allergies.     Social:  reports that he has been smoking cigarettes. He started smoking about 42 years ago. He has a 42.3 pack-year smoking history. He does not have any smokeless tobacco history on file. He reports that he does not currently use alcohol. He reports that he does not use drugs.     Family Hx: No family history of glaucoma, macular degeneration, or blindness. family history is not on file.     ROS: see hpi     Ocular examination/Dilated fundus examination:  Base Eye Exam       Visual Acuity (Snellen - Linear)         Right Left    Dist sc 20/40 20/30              Tonometry (Tonopen, 3:26 PM)         Right Left    Pressure 14 15              Pupils         Dark Light Shape React APD    Right 3 2 Round Brisk None    Left 3 2 Round Brisk None              Extraocular Movement         Right Left     Full, Ortho Full, Ortho                  Slit Lamp and Fundus Exam       External Exam         Right Left    External Normal periorbital swelling, no  fluctuant areas, no proptosis              Slit Lamp Exam         Right Left    Lids/Lashes Normal Improved upper and lower lid swelling    Conjunctiva/Sclera White and quiet 1+ inferior and temporal serous chemosis    Cornea Clear Clear    Anterior Chamber Deep and quiet Deep and quiet    Iris Round and reactive Round and reactive    Lens Clear Clear    Anterior Vitreous Normal Normal                            =======================================    Assessment/Plan:   1. Maisha-orbital cellulitis with lateral orbital abscess  - presents with three days of worsening left maisha-orbital swelling  - minimal pain, no proptosis, EOM full, normal vision and IOP, no rAPD  - CT scan from outside hospital concerning for abscess lateral to globe, but no read sent with scan  - no fluctuance felt at bedside, unable to access abscess at bedside  - recommend repeat CT orbits w and w/o contrast tomorrow morning  - recommend admit to medicine for IV vancomycin and unasyn  - no oculo-plastics staff available at this time to drain abscess, Ent consulted for possible abscess drainage, who recommended no intervention at this time  - ophthalmology will continue to follow  - 4/13/24: improving on IV antibiotics, continue for 48-72 hours. Repeat CT scan scheduled for today.       Franco Evangelista MD  PGY-2, Ophthalmology  04/13/2024  10:16 PM

## 2024-04-13 NOTE — H&P
[facility] - Emergency Dept  [facility]  History & Physical    Patient Name: Adin Copeland  MRN: [id number]  Patient Class: IP- Inpatient  Admission Date: 4/12/2024  Attending Physician: Ramon Paz MD   Primary Care Provider: No primary care provider on file.         Patient information was obtained from patient, past medical records, and ER records.     Subjective:     Principal Problem:Orbital cellulitis on left    Chief Complaint:   Chief Complaint   Patient presents with    Abscess     From [facility], here for abscess on L eye and optho consult.        HPI: Adin Copeland is a 58 y.o. male with a PMHx of nicotine dependence who presents to the ED as a transfer from [facility] ED for ophthalmology evaluation for orbital cellulitis with abscess. Labs largely unremarkable CT orbits w/ contrast revealed findings consistent with left orbital cellulitis with a 10 x 4 mm fluid collection anterior to the left lobe suspicious for an abscess. There is no post septal extension. The patient received 1.5g vancomycin and 2g Rocephin prior to transfer. The patient reports he noted mild upper eyelid pain 3 days ago. He took ASA and went to bed. He reports the next morning he noted left eye redness, swelling, and drainage. He reports it worsened today. Denies any trauam to the eye. He denies vision changes, headache, fever, or chills. He denies SOB, CP, N/V/D, abdominal pain, or dysuria. The patient endorses chronic smoker's cough that is unchanged from baseline.    In the ED, VSS, afebrile. CBC unremarkable. Glucose 122. Albumin 3.2. AST/ALT 50/46. CRP 2.5. Hep C Ab reactive. The patient received Toradol. Ophthalmology evaluated the patient and recommended vancomycin and unasyn, repeat CT orbits w/wo contrast tomorrow, and ENT evaluation for possible abscess drainage.    History reviewed. No pertinent past medical history.    History reviewed. No pertinent surgical history.    Review of patient's allergies indicates:  No Known  Allergies    No current facility-administered medications on file prior to encounter.     No current outpatient medications on file prior to encounter.     Family History    None       Tobacco Use    Smoking status: Not on file    Smokeless tobacco: Not on file   Substance and Sexual Activity    Alcohol use: Not on file    Drug use: Not on file    Sexual activity: Not on file     Review of Systems   Constitutional:  Negative for appetite change, chills, diaphoresis, fatigue and fever.   HENT:  Negative for postnasal drip, rhinorrhea and sore throat.    Eyes:  Positive for pain, discharge and redness. Negative for photophobia and visual disturbance.   Respiratory:  Positive for cough. Negative for shortness of breath and wheezing.    Cardiovascular:  Negative for chest pain, palpitations and leg swelling.   Gastrointestinal:  Negative for abdominal distention, abdominal pain, diarrhea and nausea.   Genitourinary:  Negative for dysuria, frequency and hematuria.   Musculoskeletal:  Negative for back pain, myalgias and neck pain.   Skin:  Positive for color change. Negative for pallor, rash and wound.   Neurological:  Negative for syncope, weakness, light-headedness and headaches.   Psychiatric/Behavioral:  Negative for confusion and hallucinations. The patient is not nervous/anxious.      Objective:     Vital Signs (Most Recent):  Temp: 98.3 °F (36.8 °C) (04/12/24 2014)  Pulse: 60 (04/12/24 2014)  Resp: 14 (04/12/24 2156)  BP: (!) 120/90 (04/12/24 2014)  SpO2: 97 % (04/12/24 2014) Vital Signs (24h Range):  Temp:  [97.6 °F (36.4 °C)-98.3 °F (36.8 °C)] 98.3 °F (36.8 °C)  Pulse:  [60-62] 60  Resp:  [14-23] 14  SpO2:  [97 %] 97 %  BP: (120-148)/(90) 120/90     Weight: 77.1 kg (170 lb)  There is no height or weight on file to calculate BMI.     Physical Exam  Vitals and nursing note reviewed.   Constitutional:       General: He is not in acute distress.     Appearance: He is not toxic-appearing or diaphoretic.   HENT:       Head: Normocephalic and atraumatic. Left periorbital erythema present.      Nose: Nose normal.      Mouth/Throat:      Mouth: Mucous membranes are moist.   Eyes:      General:         Left eye: Discharge present.     Extraocular Movements: Extraocular movements intact.      Conjunctiva/sclera:      Left eye: Left conjunctiva is injected. Chemosis present.      Pupils: Pupils are equal, round, and reactive to light.      Comments: Erythema and swelling of the upper and lower lids with chemosis. No significant pain with extraocular movements. See photos   Cardiovascular:      Rate and Rhythm: Normal rate and regular rhythm.      Pulses: Normal pulses.   Pulmonary:      Effort: Pulmonary effort is normal. No respiratory distress.      Breath sounds: No wheezing, rhonchi or rales.   Abdominal:      General: Bowel sounds are normal. There is no distension.      Palpations: Abdomen is soft.      Tenderness: There is no abdominal tenderness. There is no guarding.   Musculoskeletal:         General: Normal range of motion.      Cervical back: Normal range of motion.   Skin:     General: Skin is warm and dry.      Capillary Refill: Capillary refill takes less than 2 seconds.   Neurological:      General: No focal deficit present.      Mental Status: He is alert and oriented to person, place, and time.      Sensory: No sensory deficit.      Motor: No weakness.   Psychiatric:         Mood and Affect: Mood normal.         Behavior: Behavior normal.              CRANIAL NERVES     CN III, IV, VI   Pupils are equal, round, and reactive to light.             Significant Labs: All pertinent labs within the past 24 hours have been reviewed.  CBC:   Recent Labs   Lab 04/12/24 2052   WBC 11.82   HGB 14.5   HCT 42.8        CMP:   Recent Labs   Lab 04/12/24 2052      K 3.8      CO2 24   *   BUN 8   CREATININE 0.8   CALCIUM 9.1   PROT 6.6   ALBUMIN 3.2*   BILITOT 0.4   ALKPHOS 53*   AST 50*   ALT 46*    ANIONGAP 8       Significant Imaging: I have reviewed all pertinent imaging results/findings within the past 24 hours.    Assessment/Plan:     * Orbital cellulitis on left  -Afebrile, no leukocytosis.  -CRP 2.5.  -CT orbits w/ contrast at OSH reveals findings consistent with left orbital cellulitis with a 10 x 4 mm fluid collection anterior to the left lobe suspicious for an abscess. There is no post septal extension.   -The patient received vancomycin and rocephin prior to transfer, will change to unasyn and vancomycin per ophthalmology recs.  -Repeat CT orbits w/ contrast tomorrow per ophthalmology recs.  -ENT consulted per ophthalmology recs as there is no oculo-plastics staff available at this time to drain abscess, appreciate assistance.  -NPO after midnight pending ENT evaluation.  -PRN pain control.    Elevated LFTs  -AST/ALT 50/46 on admit, Tbili 0.4.  -Hep C Ab reactive.   -RUQ abd US pending.  -Continue to monitor on daily labs.    Hepatitis C antibody test positive  -Hep C Ab reactive on admit, Quantitative PCR in process.  -Pending results patient may need outpatient hepatology follow up, which I discussed with the patient.    Cigarette nicotine dependence without complication  Dangers of cigarette smoking were reviewed with patient in detail. Patient was Counseled for 3-10 minutes. Nicotine replacement options were discussed. Nicotine replacement was discussed- not prescribed per patient's request      VTE Risk Mitigation (From admission, onward)           Ordered     IP VTE LOW RISK PATIENT  Once         04/13/24 0247     Place sequential compression device  Until discontinued         04/12/24 9116                                    Marysol Packer NP  Department of Hospital Medicine  [facility] - Emergency Dept

## 2024-04-13 NOTE — PLAN OF CARE
Problem: Adult Inpatient Plan of Care  Goal: Plan of Care Review  Outcome: Ongoing, Progressing  Goal: Patient-Specific Goal (Individualized)  Outcome: Ongoing, Progressing  Goal: Absence of Hospital-Acquired Illness or Injury  Outcome: Ongoing, Progressing  Goal: Optimal Comfort and Wellbeing  Outcome: Ongoing, Progressing  Goal: Readiness for Transition of Care  Outcome: Ongoing, Progressing   Pt AAO X 4; able to express needs.  No c/o pain.  L eye swollen, red, draining, and irritated.  NPO upon arrival to 16W.  CT head and orbits completed.     ENT consulted/ currently at bedside for evaluation.  Urine specimen needed to send to lab.  NO Accu check/ NO Telemetry.  VSS.  Afebrile.  NS bolus completed.  Safety maintained.  Bed in low position,  call  light in reach.

## 2024-04-13 NOTE — ASSESSMENT & PLAN NOTE
58M with left orbital cellulitis and small abscess just medial to lateral canthus based on images from outside system. Patient reports improvement with antibiotics and edema appears decreased based on photos in chart. Denies vision changes.     -no acute ENT intervention at this time  -ok for diet  -Abx per ophtho  -Fu repeat scan

## 2024-04-13 NOTE — ASSESSMENT & PLAN NOTE
-Hep C Ab reactive on admit, Quantitative PCR in process.  -Pending results patient may need outpatient hepatology follow up, which I discussed with the patient.

## 2024-04-13 NOTE — HPI
58M presented to [facility] ED last night with 3 days of progressive left eye swelling. CT scan was performed with showed periorbital cellulitis and possible small lateral orbital abscess. Per chart review, the ED discussed the case with ophthalmology team at [facility], who accepted ED to ED transfer for ophthalmology evaluation. Ophthalmology assessed the patient last night, recommended repeat scan and unasyn/vanc. Patient is admitted to the  service. WBC normal.     On my exam this morning, patient reports improvement of left periorbital edema and pain. Denies vision changes. Denies rhinorrhea. Denies trauma to left eye or bug bites, etc. However, he does have cats in his apartment, so possible he got scratched while sleeping.

## 2024-04-13 NOTE — ED PROVIDER NOTES
Encounter Date: 4/12/2024       History     Chief Complaint   Patient presents with    Abscess     From [facility], here for abscess on L eye and optho consult.     58-year-old male with no known past medical history presents as a transfer from [facility] for Ophthalmology evaluation for orbital cellulitis with abscess.  He reports left-sided eye pain for 3 days with some eye discharge.  Denies visual changes, fever or pain with eye movements.  He does not wear glasses or contact      Review of patient's allergies indicates:  No Known Allergies  History reviewed. No pertinent past medical history.  History reviewed. No pertinent surgical history.  History reviewed. No pertinent family history.     Review of Systems    Physical Exam     Initial Vitals   BP Pulse Resp Temp SpO2   04/12/24 2014 04/12/24 2014 04/12/24 2156 04/12/24 2014 04/12/24 2014   (!) 120/90 60 14 98.3 °F (36.8 °C) 97 %      MAP       --                Physical Exam    Nursing note and vitals reviewed.  Constitutional: He appears well-developed and well-nourished. He is not diaphoretic. No distress.   HENT:   Head: Normocephalic and atraumatic.   Eyes: EOM are normal. Pupils are equal, round, and reactive to light. Left eye exhibits chemosis and discharge. Right conjunctiva is not injected. Right conjunctiva has no hemorrhage. Left conjunctiva is injected.   Erythema and swelling of the upper and lower lids with chemosis.  No significant pain with extraocular movements    IOP: OD: 11, OS: 13   Neck: Neck supple.   Normal range of motion.  Cardiovascular:  Normal rate, regular rhythm, normal heart sounds and intact distal pulses.     Exam reveals no gallop and no friction rub.       No murmur heard.  Pulmonary/Chest: Breath sounds normal. No respiratory distress. He has no wheezes. He has no rales. He exhibits no tenderness.   Musculoskeletal:         General: Normal range of motion.      Cervical back: Normal range of motion and neck supple.      Neurological: He is alert and oriented to person, place, and time.   Skin: Skin is warm and dry.   Psychiatric: He has a normal mood and affect.         ED Course   Procedures  Labs Reviewed   HEPATITIS C ANTIBODY - Abnormal; Notable for the following components:       Result Value    Hepatitis C Ab Reactive (*)     All other components within normal limits    Narrative:     Release to patient->Immediate   CBC W/ AUTO DIFFERENTIAL - Abnormal; Notable for the following components:    RBC 4.44 (*)     MCH 32.7 (*)     All other components within normal limits   COMPREHENSIVE METABOLIC PANEL - Abnormal; Notable for the following components:    Glucose 122 (*)     Albumin 3.2 (*)     Alkaline Phosphatase 53 (*)     AST 50 (*)     ALT 46 (*)     All other components within normal limits   HIV 1 / 2 ANTIBODY    Narrative:     Release to patient->Immediate   C-REACTIVE PROTEIN   HEPATITIS C RNA, QUANTITATIVE, PCR          Imaging Results    None          Medications   ketorolac injection 9.999 mg (9.999 mg Intravenous Given 4/12/24 2057)   proparacaine 0.5 % ophthalmic solution 1 drop (1 drop Both Eyes Given 4/12/24 2136)     Medical Decision Making  58-year-old male presenting as a transfer for orbital cellulitis with abscess.  His visual acuity is relatively intact, actually better in the affected eye than the unaffected.  His vitals are normal and he is nontoxic appearing.    Differential diagnosis:  Orbital cellulitis   Orbital abscess   Conjunctivitis   Not clinically septic    Will recheck lab workup, consult ophthalmology.    Case discussed with Ophthalmology who evaluated the patient and recommend repeat CT scan.  Dispo pending ophthalmology recommendations. I am signing out to [employer].    9:57 PM      Amount and/or Complexity of Data Reviewed  Labs: ordered. Decision-making details documented in ED Course.  Radiology: ordered.    Risk  Prescription drug management.  Decision regarding  hospitalization.               ED Course as of 04/12/24 2157 Fri Apr 12, 2024 2058 Case discussed with the ophthalmology resident who will come evaluate the patient [CC]   2117 Visual acuity 20/20 in the affected eye, 20/40 in the unaffected eye [CC]   2131 CRP: 2.5 [CC]   2135 WBC: 11.82 [CC]   2152 Hepatitis C Ab(!): Reactive [CC]   2152 Reviewed imaging with Ophthalmology resident who requests repeat CT [CC]      ED Course User Index  [CC] Mary Reza PA-C                           Clinical Impression:  Final diagnoses:  [H05.012] Orbital cellulitis on left (Primary)  [H05.012] Orbital abscess, left  [R76.8] Hepatitis C antibody positive in blood          ED Disposition Condition    Admit Stable                Mary Reza PA-C  04/12/24 2157

## 2024-04-13 NOTE — PROGRESS NOTES
Pharmacist Renal Dose Adjustment Note    Adin Copeland is a 58 y.o. male being treated with the medication ampicillin/sulbactam    Patient Data:    Vital Signs (Most Recent):  Temp: 98.5 °F (36.9 °C) (04/13/24 0815)  Pulse: (!) 53 (04/13/24 0815)  Resp: 16 (04/13/24 0815)  BP: 116/72 (04/13/24 0815)  SpO2: 95 % (04/13/24 0815) Vital Signs (72h Range):  Temp:  [97.6 °F (36.4 °C)-98.5 °F (36.9 °C)]   Pulse:  [53-65]   Resp:  [14-23]   BP: (116-148)/(72-90)   SpO2:  [95 %-98 %]      Recent Labs   Lab 04/12/24 2052 04/13/24 0419   CREATININE 0.8 0.8     Serum creatinine: 0.8 mg/dL 04/13/24 0419  Estimated creatinine clearance: 112.7 mL/min    Ampicillin/sulbactam 3g every 8 hours is being changed to ampicillin/sulbactam 3g every 6 hours based on patient's current renal function.     Pharmacist's Name: Ami Parks  Pharmacist's Extension: [phone]

## 2024-04-13 NOTE — SUBJECTIVE & OBJECTIVE
History reviewed. No pertinent past medical history.    History reviewed. No pertinent surgical history.    Review of patient's allergies indicates:  No Known Allergies    No current facility-administered medications on file prior to encounter.     No current outpatient medications on file prior to encounter.     Family History    None       Tobacco Use    Smoking status: Not on file    Smokeless tobacco: Not on file   Substance and Sexual Activity    Alcohol use: Not on file    Drug use: Not on file    Sexual activity: Not on file     Review of Systems   Constitutional:  Negative for appetite change, chills, diaphoresis, fatigue and fever.   HENT:  Negative for postnasal drip, rhinorrhea and sore throat.    Eyes:  Positive for pain, discharge and redness. Negative for photophobia and visual disturbance.   Respiratory:  Positive for cough. Negative for shortness of breath and wheezing.    Cardiovascular:  Negative for chest pain, palpitations and leg swelling.   Gastrointestinal:  Negative for abdominal distention, abdominal pain, diarrhea and nausea.   Genitourinary:  Negative for dysuria, frequency and hematuria.   Musculoskeletal:  Negative for back pain, myalgias and neck pain.   Skin:  Positive for color change. Negative for pallor, rash and wound.   Neurological:  Negative for syncope, weakness, light-headedness and headaches.   Psychiatric/Behavioral:  Negative for confusion and hallucinations. The patient is not nervous/anxious.      Objective:     Vital Signs (Most Recent):  Temp: 98.3 °F (36.8 °C) (04/12/24 2014)  Pulse: 60 (04/12/24 2014)  Resp: 14 (04/12/24 2156)  BP: (!) 120/90 (04/12/24 2014)  SpO2: 97 % (04/12/24 2014) Vital Signs (24h Range):  Temp:  [97.6 °F (36.4 °C)-98.3 °F (36.8 °C)] 98.3 °F (36.8 °C)  Pulse:  [60-62] 60  Resp:  [14-23] 14  SpO2:  [97 %] 97 %  BP: (120-148)/(90) 120/90     Weight: 77.1 kg (170 lb)  There is no height or weight on file to calculate BMI.     Physical Exam  Vitals  and nursing note reviewed.   Constitutional:       General: He is not in acute distress.     Appearance: He is not toxic-appearing or diaphoretic.   HENT:      Head: Normocephalic and atraumatic. Left periorbital erythema present.      Nose: Nose normal.      Mouth/Throat:      Mouth: Mucous membranes are moist.   Eyes:      General:         Left eye: Discharge present.     Extraocular Movements: Extraocular movements intact.      Conjunctiva/sclera:      Left eye: Left conjunctiva is injected. Chemosis present.      Pupils: Pupils are equal, round, and reactive to light.      Comments: Erythema and swelling of the upper and lower lids with chemosis. No significant pain with extraocular movements. See photos   Cardiovascular:      Rate and Rhythm: Normal rate and regular rhythm.      Pulses: Normal pulses.   Pulmonary:      Effort: Pulmonary effort is normal. No respiratory distress.      Breath sounds: No wheezing, rhonchi or rales.   Abdominal:      General: Bowel sounds are normal. There is no distension.      Palpations: Abdomen is soft.      Tenderness: There is no abdominal tenderness. There is no guarding.   Musculoskeletal:         General: Normal range of motion.      Cervical back: Normal range of motion.   Skin:     General: Skin is warm and dry.      Capillary Refill: Capillary refill takes less than 2 seconds.   Neurological:      General: No focal deficit present.      Mental Status: He is alert and oriented to person, place, and time.      Sensory: No sensory deficit.      Motor: No weakness.   Psychiatric:         Mood and Affect: Mood normal.         Behavior: Behavior normal.              CRANIAL NERVES     CN III, IV, VI   Pupils are equal, round, and reactive to light.             Significant Labs: All pertinent labs within the past 24 hours have been reviewed.  CBC:   Recent Labs   Lab 04/12/24 2052   WBC 11.82   HGB 14.5   HCT 42.8        CMP:   Recent Labs   Lab 04/12/24 2052       K 3.8      CO2 24   *   BUN 8   CREATININE 0.8   CALCIUM 9.1   PROT 6.6   ALBUMIN 3.2*   BILITOT 0.4   ALKPHOS 53*   AST 50*   ALT 46*   ANIONGAP 8       Significant Imaging: I have reviewed all pertinent imaging results/findings within the past 24 hours.

## 2024-04-13 NOTE — SUBJECTIVE & OBJECTIVE
Medications:  Continuous Infusions:  Current Facility-Administered Medications   Medication Dose Route Frequency Provider Last Rate Last Admin    acetaminophen tablet 650 mg  650 mg Oral Q6H PRN Marysol Packer NP        ampicillin-sulbactam (UNASYN) 3 g in sodium chloride 0.9 % 100 mL IVPB (MB+)  3 g Intravenous Q8H Marysol Packer NP   Stopped at 04/13/24 0101    dextrose 10% bolus 125 mL 125 mL  12.5 g Intravenous PRN Marysol Packer NP        dextrose 10% bolus 250 mL 250 mL  25 g Intravenous PRN Marysol Packer NP        glucagon (human recombinant) injection 1 mg  1 mg Intramuscular PRN Marysol Packer NP        glucose chewable tablet 16 g  16 g Oral PRN Marysol Packer NP        glucose chewable tablet 24 g  24 g Oral PRN Marysol Packer NP        melatonin tablet 6 mg  6 mg Oral Nightly PRN Thuan-LinkMary PA-C        naloxone 0.4 mg/mL injection 0.02 mg  0.02 mg Intravenous PRN Marysol Packer NP        ondansetron injection 4 mg  4 mg Intravenous Q8H PRN Marysol Packer NP        sodium chloride 0.9% flush 10 mL  10 mL Intravenous PRN Carosone-LinkMary, PA-C        sodium chloride 0.9% flush 10 mL  10 mL Intravenous Q12H PRN Marysol Packer NP        vancomycin - pharmacy to dose   Intravenous pharmacy to manage frequency Marysol Packer NP        vancomycin 1,250 mg in dextrose 5 % (D5W) 250 mL IVPB (Vial-Mate)  15 mg/kg Intravenous Q12H Ramon Paz MD         Scheduled Meds:  Current Facility-Administered Medications   Medication Dose Route Frequency Provider Last Rate Last Admin    acetaminophen tablet 650 mg  650 mg Oral Q6H PRN Marysol Packer NP        ampicillin-sulbactam (UNASYN) 3 g in sodium chloride 0.9 % 100 mL IVPB (MB+)  3 g Intravenous Q8H Marysol Packer NP   Stopped at 04/13/24 0101    dextrose 10% bolus 125 mL 125 mL  12.5 g Intravenous PRN Ochoa, Marysol R., NP        dextrose 10% bolus 250 mL 250 mL  25 g Intravenous  PRMarysol Yo, NP        glucagon (human recombinant) injection 1 mg  1 mg Intramuscular PRN Marysol Packer, NP        glucose chewable tablet 16 g  16 g Oral PRN Marysol Packer, NP        glucose chewable tablet 24 g  24 g Oral PRN Marysol Packer, NP        melatonin tablet 6 mg  6 mg Oral Nightly PRN Carosone-Link, Mary, PA-C        naloxone 0.4 mg/mL injection 0.02 mg  0.02 mg Intravenous PRN Marysol Packer, ROMERO        ondansetron injection 4 mg  4 mg Intravenous Q8H PRN Marysol Packer, NP        sodium chloride 0.9% flush 10 mL  10 mL Intravenous PRN Carosone-Link, Mary, PA-C        sodium chloride 0.9% flush 10 mL  10 mL Intravenous Q12H PRN Marysol Packer, ROMERO        vancomycin - pharmacy to dose   Intravenous pharmacy to manage frequency Marysol Packer NP        vancomycin 1,250 mg in dextrose 5 % (D5W) 250 mL IVPB (Vial-Mate)  15 mg/kg Intravenous Q12H Ramon Paz MD         PRN Meds:  Current Facility-Administered Medications   Medication Dose Route Frequency Provider Last Rate Last Admin    acetaminophen tablet 650 mg  650 mg Oral Q6H PRN Marysol Packer NP        ampicillin-sulbactam (UNASYN) 3 g in sodium chloride 0.9 % 100 mL IVPB (MB+)  3 g Intravenous Q8H Marysol Packer NP   Stopped at 04/13/24 0101    dextrose 10% bolus 125 mL 125 mL  12.5 g Intravenous PRN Marysol Packer NP        dextrose 10% bolus 250 mL 250 mL  25 g Intravenous PRN Marysol Packer NP        glucagon (human recombinant) injection 1 mg  1 mg Intramuscular PRN Marysol Packer, NP        glucose chewable tablet 16 g  16 g Oral PRN Marysol Packer, NP        glucose chewable tablet 24 g  24 g Oral PRN Marysol Packer, NP        melatonin tablet 6 mg  6 mg Oral Nightly PRN Carosone-Link, Mary, PA-C        naloxone 0.4 mg/mL injection 0.02 mg  0.02 mg Intravenous PRN Marysol Packer, NP        ondansetron injection 4 mg  4 mg Intravenous Q8H PRN Marysol Packer  CLAUDIA, ROMERO        sodium chloride 0.9% flush 10 mL  10 mL Intravenous PRN Carosone-Link, Mary, PA-C        sodium chloride 0.9% flush 10 mL  10 mL Intravenous Q12H PRN Marysol Packer NP        vancomycin - pharmacy to dose   Intravenous pharmacy to manage frequency Marysol Packer NP        vancomycin 1,250 mg in dextrose 5 % (D5W) 250 mL IVPB (Vial-Mate)  15 mg/kg Intravenous Q12H Ramon Paz MD            Current Facility-Administered Medications   Medication Dose Route Frequency Provider Last Rate Last Admin    acetaminophen tablet 650 mg  650 mg Oral Q6H PRN Marysol Packer NP        ampicillin-sulbactam (UNASYN) 3 g in sodium chloride 0.9 % 100 mL IVPB (MB+)  3 g Intravenous Q8H Marysol Packer NP   Stopped at 04/13/24 0101    dextrose 10% bolus 125 mL 125 mL  12.5 g Intravenous PRN Marysol Packer NP        dextrose 10% bolus 250 mL 250 mL  25 g Intravenous PRN Marysol Packer, NP        glucagon (human recombinant) injection 1 mg  1 mg Intramuscular PRN Marysol Packer, NP        glucose chewable tablet 16 g  16 g Oral PRN Marysol Packer, NP        glucose chewable tablet 24 g  24 g Oral PRN Marysol Packer, NP        melatonin tablet 6 mg  6 mg Oral Nightly PRN Yunsone-Link, Mary, PA-C        naloxone 0.4 mg/mL injection 0.02 mg  0.02 mg Intravenous PRN Marysol Packer NP        ondansetron injection 4 mg  4 mg Intravenous Q8H PRN Marysol Packer NP        sodium chloride 0.9% flush 10 mL  10 mL Intravenous PRN Carosone-Link, Mary, PA-C        sodium chloride 0.9% flush 10 mL  10 mL Intravenous Q12H PRN Marysol Packer NP        vancomycin - pharmacy to dose   Intravenous pharmacy to manage frequency Marysol Packer NP        vancomycin 1,250 mg in dextrose 5 % (D5W) 250 mL IVPB (Vial-Mate)  15 mg/kg Intravenous Q12H Ramon Paz MD           Review of patient's allergies indicates:  No Known Allergies    No past medical history on file.  Past  Surgical History:   Procedure Laterality Date    HERNIA REPAIR      TONSILLECTOMY       Family History    None       Tobacco Use    Smoking status: Every Day     Current packs/day: 1.00     Average packs/day: 1 pack/day for 42.3 years (42.3 ttl pk-yrs)     Types: Cigarettes     Start date: 1982    Smokeless tobacco: Not on file   Substance and Sexual Activity    Alcohol use: Not Currently    Drug use: Never    Sexual activity: Not on file     Review of Systems  Objective:     Vital Signs (Most Recent):  Temp: 98 °F (36.7 °C) (04/13/24 0517)  Pulse: (!) 57 (04/13/24 0517)  Resp: 19 (04/13/24 0517)  BP: 126/79 (04/13/24 0517)  SpO2: 96 % (04/13/24 0517) Vital Signs (24h Range):  Temp:  [97.6 °F (36.4 °C)-98.3 °F (36.8 °C)] 98 °F (36.7 °C)  Pulse:  [57-65] 57  Resp:  [14-23] 19  SpO2:  [95 %-98 %] 96 %  BP: (120-148)/(79-90) 126/79     Weight: 78.2 kg (172 lb 6.4 oz)  Body mass index is 21.55 kg/m².         Physical Exam     NAD  Awake and alert  Head atraumatic   Auricles WNL AU  Eyes  -Right: No edema, EOMI, VA grossly intact, pupil reactive  -Left: Upper/lower lid edema, periorbital edema and erythema, minimally TTP, no palpable fluctuance, inferior and lateral chemosis, EOMI  Nose w/ normal external appearance, no rhinorrhea  MMM, anterior tongue mobile, FOM soft, OP patent w/ midline uvula   Neck soft, not TTP, normal ROM, no LAD  Normal WOB, no stridor or stertor    Significant Labs:  CBC:   Recent Labs   Lab 04/13/24  0419   WBC 9.88   RBC 4.34*   HGB 14.0   HCT 42.6      MCV 98   MCH 32.3*   MCHC 32.9       Significant Diagnostics:  CT: I have reviewed all pertinent results/findings within the past 24 hours and my personal findings are:  I reviewed images from [facility] EMR - left orbital cellulitis with a small abscess that is antererio/medial to the lateral canthus

## 2024-04-14 LAB
ALBUMIN SERPL BCP-MCNC: 3.1 G/DL (ref 3.5–5.2)
ALP SERPL-CCNC: 50 U/L (ref 55–135)
ALT SERPL W/O P-5'-P-CCNC: 52 U/L (ref 10–44)
ANION GAP SERPL CALC-SCNC: 7 MMOL/L (ref 8–16)
AST SERPL-CCNC: 57 U/L (ref 10–40)
BASOPHILS # BLD AUTO: 0.06 K/UL (ref 0–0.2)
BASOPHILS NFR BLD: 0.7 % (ref 0–1.9)
BILIRUB SERPL-MCNC: 0.6 MG/DL (ref 0.1–1)
BUN SERPL-MCNC: 9 MG/DL (ref 6–20)
CALCIUM SERPL-MCNC: 9 MG/DL (ref 8.7–10.5)
CHLORIDE SERPL-SCNC: 107 MMOL/L (ref 95–110)
CO2 SERPL-SCNC: 22 MMOL/L (ref 23–29)
CREAT SERPL-MCNC: 0.7 MG/DL (ref 0.5–1.4)
DIFFERENTIAL METHOD BLD: ABNORMAL
EOSINOPHIL # BLD AUTO: 0.2 K/UL (ref 0–0.5)
EOSINOPHIL NFR BLD: 2.1 % (ref 0–8)
ERYTHROCYTE [DISTWIDTH] IN BLOOD BY AUTOMATED COUNT: 13 % (ref 11.5–14.5)
EST. GFR  (NO RACE VARIABLE): >60 ML/MIN/1.73 M^2
GLUCOSE SERPL-MCNC: 81 MG/DL (ref 70–110)
HCT VFR BLD AUTO: 44 % (ref 40–54)
HGB BLD-MCNC: 14.6 G/DL (ref 14–18)
IMM GRANULOCYTES # BLD AUTO: 0.03 K/UL (ref 0–0.04)
IMM GRANULOCYTES NFR BLD AUTO: 0.4 % (ref 0–0.5)
LYMPHOCYTES # BLD AUTO: 4.2 K/UL (ref 1–4.8)
LYMPHOCYTES NFR BLD: 49.2 % (ref 18–48)
MAGNESIUM SERPL-MCNC: 1.9 MG/DL (ref 1.6–2.6)
MCH RBC QN AUTO: 32.3 PG (ref 27–31)
MCHC RBC AUTO-ENTMCNC: 33.2 G/DL (ref 32–36)
MCV RBC AUTO: 97 FL (ref 82–98)
MONOCYTES # BLD AUTO: 0.7 K/UL (ref 0.3–1)
MONOCYTES NFR BLD: 8.2 % (ref 4–15)
NEUTROPHILS # BLD AUTO: 3.3 K/UL (ref 1.8–7.7)
NEUTROPHILS NFR BLD: 39.4 % (ref 38–73)
NRBC BLD-RTO: 0 /100 WBC
PLATELET # BLD AUTO: 234 K/UL (ref 150–450)
PMV BLD AUTO: 10.3 FL (ref 9.2–12.9)
POTASSIUM SERPL-SCNC: 4 MMOL/L (ref 3.5–5.1)
PROT SERPL-MCNC: 6.5 G/DL (ref 6–8.4)
RBC # BLD AUTO: 4.52 M/UL (ref 4.6–6.2)
SODIUM SERPL-SCNC: 136 MMOL/L (ref 136–145)
VANCOMYCIN TROUGH SERPL-MCNC: 15.3 UG/ML (ref 10–22)
WBC # BLD AUTO: 8.45 K/UL (ref 3.9–12.7)

## 2024-04-14 PROCEDURE — 11000001 HC ACUTE MED/SURG PRIVATE ROOM

## 2024-04-14 PROCEDURE — 63600175 PHARM REV CODE 636 W HCPCS: Performed by: HOSPITALIST

## 2024-04-14 PROCEDURE — 21400001 HC TELEMETRY ROOM

## 2024-04-14 PROCEDURE — 63600175 PHARM REV CODE 636 W HCPCS: Performed by: INTERNAL MEDICINE

## 2024-04-14 PROCEDURE — 83735 ASSAY OF MAGNESIUM: CPT | Performed by: NURSE PRACTITIONER

## 2024-04-14 PROCEDURE — 80053 COMPREHEN METABOLIC PANEL: CPT | Performed by: NURSE PRACTITIONER

## 2024-04-14 PROCEDURE — 25500020 PHARM REV CODE 255: Performed by: INTERNAL MEDICINE

## 2024-04-14 PROCEDURE — 25000003 PHARM REV CODE 250: Performed by: HOSPITALIST

## 2024-04-14 PROCEDURE — 85025 COMPLETE CBC W/AUTO DIFF WBC: CPT | Performed by: NURSE PRACTITIONER

## 2024-04-14 PROCEDURE — 25000003 PHARM REV CODE 250: Performed by: INTERNAL MEDICINE

## 2024-04-14 PROCEDURE — 80202 ASSAY OF VANCOMYCIN: CPT | Performed by: HOSPITALIST

## 2024-04-14 RX ORDER — POLYETHYLENE GLYCOL 3350 17 G/17G
17 POWDER, FOR SOLUTION ORAL DAILY
Status: DISCONTINUED | OUTPATIENT
Start: 2024-04-14 | End: 2024-04-15 | Stop reason: HOSPADM

## 2024-04-14 RX ORDER — AMOXICILLIN 250 MG
2 CAPSULE ORAL 2 TIMES DAILY PRN
Status: DISCONTINUED | OUTPATIENT
Start: 2024-04-14 | End: 2024-04-15 | Stop reason: HOSPADM

## 2024-04-14 RX ADMIN — POLYETHYLENE GLYCOL 3350 17 G: 17 POWDER, FOR SOLUTION ORAL at 10:04

## 2024-04-14 RX ADMIN — DOCUSATE SODIUM AND SENNOSIDES 2 TABLET: 8.6; 5 TABLET, FILM COATED ORAL at 06:04

## 2024-04-14 RX ADMIN — AMPICILLIN SODIUM AND SULBACTAM SODIUM 3 G: 2; 1 INJECTION, POWDER, FOR SOLUTION INTRAMUSCULAR; INTRAVENOUS at 08:04

## 2024-04-14 RX ADMIN — AMPICILLIN SODIUM AND SULBACTAM SODIUM 3 G: 2; 1 INJECTION, POWDER, FOR SOLUTION INTRAMUSCULAR; INTRAVENOUS at 01:04

## 2024-04-14 RX ADMIN — VANCOMYCIN HYDROCHLORIDE 1250 MG: 1.25 INJECTION, POWDER, LYOPHILIZED, FOR SOLUTION INTRAVENOUS at 06:04

## 2024-04-14 RX ADMIN — DOCUSATE SODIUM AND SENNOSIDES 2 TABLET: 8.6; 5 TABLET, FILM COATED ORAL at 08:04

## 2024-04-14 NOTE — PLAN OF CARE
"[facility] - [facility] ([place] [id number])  Initial Discharge Assessment       Primary Care Provider: Delia, Primary Doctor    Admission Diagnosis: Orbital cellulitis on left [H05.012]  Chest pain [R07.9]  Orbital abscess, left [H05.012]  Hepatitis C antibody positive in blood [R76.8]    Admission Date: 4/12/2024  Expected Discharge Date: 4/17/2024    Transition of Care Barriers: (P) Unisured    Payor: /     Extended Emergency Contact Information  Primary Emergency Contact: Amberly Badillo  Mobile Phone: [phone]  Relation: Spouse  Preferred language: English   needed? No    Discharge Plan A: (P) Home with family  Discharge Plan B: (P) Home    No Pharmacies Listed    Initial Assessment (most recent)       Adult Discharge Assessment - 04/13/24 0947          Discharge Assessment    Assessment Type Discharge Planning Assessment (P)      Confirmed/corrected address, phone number and insurance Yes (P)      Confirmed Demographics Correct on Facesheet (P)      Source of Information patient (P)      Communicated CLYDE with patient/caregiver Yes (P)      Reason For Admission "swollen eye" (P)      People in Home significant other (P)      Do you expect to return to your current living situation? Yes (P)      Do you have help at home or someone to help you manage your care at home? Yes (P)      Who are your caregiver(s) and their phone number(s)? JustinoAmberly (Spouse)  [phone] (P)      Prior to hospitilization cognitive status: Alert/Oriented (P)      Current cognitive status: Alert/Oriented (P)      Walking or Climbing Stairs Difficulty no (P)      Dressing/Bathing Difficulty no (P)      Home Accessibility stairs to enter home (P)      Number of Stairs, Main Entrance three (P)      Home Layout Able to live on 1st floor (P)      Equipment Currently Used at Home none (P)      Readmission within 30 days? No (P)      Patient currently being followed by outpatient case management? No (P)      Do you currently have " service(s) that help you manage your care at home? No (P)      Do you have prescription coverage? No (P)      Coverage No insurance (P)      Do you have any problems affording any of your prescribed medications? TBD (P)      Who is going to help you get home at discharge? Amberly Badillo (Spouse)  [phone] (P)      How do you get to doctors appointments? public transportation (P)      Are you on dialysis? No (P)      Do you take coumadin? No (P)      Discharge Plan A Home with family (P)      Discharge Plan B Home (P)      DME Needed Upon Discharge  none (P)      Discharge Plan discussed with: Patient (P)      Transition of Care Barriers Unisured (P)         Physical Activity    On average, how many days per week do you engage in moderate to strenuous exercise (like a brisk walk)? 0 days (P)      On average, how many minutes do you engage in exercise at this level? 0 min (P)         Financial Resource Strain    How hard is it for you to pay for the very basics like food, housing, medical care, and heating? Not very hard (P)         Housing Stability    In the last 12 months, was there a time when you were not able to pay the mortgage or rent on time? No (P)      In the past 12 months, how many times have you moved where you were living? 1 (P)      At any time in the past 12 months, were you homeless or living in a shelter (including now)? No (P)         Transportation Needs    In the past 12 months, has lack of transportation kept you from medical appointments or from getting medications? No (P)      In the past 12 months, has lack of transportation kept you from meetings, work, or from getting things needed for daily living? No (P)         Food Insecurity    Within the past 12 months, you worried that your food would run out before you got the money to buy more. Never true (P)      Within the past 12 months, the food you bought just didn't last and you didn't have money to get more. Never true (P)         Social  Connections    In a typical week, how many times do you talk on the phone with family, friends, or neighbors? More than three times a week (P)      How often do you get together with friends or relatives? More than three times a week (P)      Are you , , , , never , or living with a partner? Living with partner (P)         Alcohol Use    Q1: How often do you have a drink containing alcohol? Never (P)      Q2: How many drinks containing alcohol do you have on a typical day when you are drinking? Patient does not drink (P)      Q3: How often do you have six or more drinks on one occasion? Never (P)         OTHER    Name(s) of People in Home Amberly Badillo (Spouse)  [phone] (P)                  Discharge Plan A and Plan B have been determined by review of patient's clinical status, future medical and therapeutic needs, and coverage/benefits for post-acute care in coordination with multidisciplinary team members.                           MARCO Viramontes, LMSW  [facility]  Case Management  Ext. [phone]

## 2024-04-14 NOTE — PROGRESS NOTES
[facility] - Med Surg ([place] [address])  Otorhinolaryngology-Head & Neck Surgery  Progress Note    Subjective:     Post-Op Info:  * No surgery found *      Hospital Day: 3     Interval History: NAEO. CT Orbits done overnight. Reports continued improvement of symptoms. Reports some thick lacrimation from left eye    Medications:  Continuous Infusions:  Current Facility-Administered Medications   Medication Dose Route Frequency Provider Last Rate Last Admin    acetaminophen tablet 650 mg  650 mg Oral Q6H PRN Marysol Packer, NP        ampicillin-sulbactam (UNASYN) 3 g in sodium chloride 0.9 % 100 mL IVPB (MB+)  3 g Intravenous Q6H Tereso Patel MD   Stopped at 04/14/24 0227    dextrose 10% bolus 125 mL 125 mL  12.5 g Intravenous PRN Marysol Packer NP        dextrose 10% bolus 250 mL 250 mL  25 g Intravenous PRN Marysol Packer NP        glucagon (human recombinant) injection 1 mg  1 mg Intramuscular PRN Marysol Packer NP        glucose chewable tablet 16 g  16 g Oral PRN Marysol Packer, NP        glucose chewable tablet 24 g  24 g Oral PRN Marysol Packer NP        melatonin tablet 6 mg  6 mg Oral Nightly PRN Mary Reza PA-C        naloxone 0.4 mg/mL injection 0.02 mg  0.02 mg Intravenous PRN Marysol Packer NP        ondansetron injection 4 mg  4 mg Intravenous Q8H PRN Marysol Packer NP        polyethylene glycol packet 17 g  17 g Oral Daily Raphael Suazo DO        senna-docusate 8.6-50 mg per tablet 2 tablet  2 tablet Oral BID PRN Raphael Suazo, DO   2 tablet at 04/14/24 0616    sodium chloride 0.9% flush 10 mL  10 mL Intravenous PRN Mary Reza PA-C        sodium chloride 0.9% flush 10 mL  10 mL Intravenous Q12H PRN Marysol Packer NP        vancomycin - pharmacy to dose   Intravenous pharmacy to manage frequency Marysol Packer NP        vancomycin 1,250 mg in dextrose 5 % (D5W) 250 mL IVPB (Vial-Mate)  15 mg/kg Intravenous Q12H Ramon Paz MD  166.7 mL/hr at 04/14/24 0617 1,250 mg at 04/14/24 0617     Scheduled Meds:  Current Facility-Administered Medications   Medication Dose Route Frequency Provider Last Rate Last Admin    acetaminophen tablet 650 mg  650 mg Oral Q6H PRN Marysol Packer, NP        ampicillin-sulbactam (UNASYN) 3 g in sodium chloride 0.9 % 100 mL IVPB (MB+)  3 g Intravenous Q6H Tereso Patel MD   Stopped at 04/14/24 0227    dextrose 10% bolus 125 mL 125 mL  12.5 g Intravenous PRN Marysol Packer, NP        dextrose 10% bolus 250 mL 250 mL  25 g Intravenous PRN Marysol Packer, NP        glucagon (human recombinant) injection 1 mg  1 mg Intramuscular PRN Marysol Packer, NP        glucose chewable tablet 16 g  16 g Oral PRN Marysol Packer, NP        glucose chewable tablet 24 g  24 g Oral PRN Marysol Packer NP        melatonin tablet 6 mg  6 mg Oral Nightly PRN Mary Reza PA-C        naloxone 0.4 mg/mL injection 0.02 mg  0.02 mg Intravenous PRN Marysol Packer NP        ondansetron injection 4 mg  4 mg Intravenous Q8H PRN Marysol Packer, ROMERO        polyethylene glycol packet 17 g  17 g Oral Daily Raphael Suazo,         senna-docusate 8.6-50 mg per tablet 2 tablet  2 tablet Oral BID PRN Raphael Suazo, DO   2 tablet at 04/14/24 0616    sodium chloride 0.9% flush 10 mL  10 mL Intravenous PRN Mary Reza PA-C        sodium chloride 0.9% flush 10 mL  10 mL Intravenous Q12H PRN Marysol Packer NP        vancomycin - pharmacy to dose   Intravenous pharmacy to manage frequency Marysol Packer NP        vancomycin 1,250 mg in dextrose 5 % (D5W) 250 mL IVPB (Vial-Mate)  15 mg/kg Intravenous Q12H Ramon Paz .7 mL/hr at 04/14/24 0617 1,250 mg at 04/14/24 0617     PRN Meds:  Current Facility-Administered Medications   Medication Dose Route Frequency Provider Last Rate Last Admin    acetaminophen tablet 650 mg  650 mg Oral Q6H PRN Marysol Packer, NP         ampicillin-sulbactam (UNASYN) 3 g in sodium chloride 0.9 % 100 mL IVPB (MB+)  3 g Intravenous Q6H Tereso Patel MD   Stopped at 04/14/24 0227    dextrose 10% bolus 125 mL 125 mL  12.5 g Intravenous PRN Marysol Packer NP        dextrose 10% bolus 250 mL 250 mL  25 g Intravenous PRN Marysol Packer NP        glucagon (human recombinant) injection 1 mg  1 mg Intramuscular PRN Marysol Packer NP        glucose chewable tablet 16 g  16 g Oral PRN Marysol Packer NP        glucose chewable tablet 24 g  24 g Oral PRN Marysol Packer NP        melatonin tablet 6 mg  6 mg Oral Nightly PRN Mary Reza PA-C        naloxone 0.4 mg/mL injection 0.02 mg  0.02 mg Intravenous PRN Marysol Packer NP        ondansetron injection 4 mg  4 mg Intravenous Q8H PRN Marysol Packer NP        polyethylene glycol packet 17 g  17 g Oral Daily Raphael Suazo DO        senna-docusate 8.6-50 mg per tablet 2 tablet  2 tablet Oral BID PRN Raphael Suazo DO   2 tablet at 04/14/24 0616    sodium chloride 0.9% flush 10 mL  10 mL Intravenous PRN Mary Reza PA-C        sodium chloride 0.9% flush 10 mL  10 mL Intravenous Q12H PRN Marysol Packer NP        vancomycin - pharmacy to dose   Intravenous pharmacy to manage frequency Marysol Packer NP        vancomycin 1,250 mg in dextrose 5 % (D5W) 250 mL IVPB (Vial-Mate)  15 mg/kg Intravenous Q12H Ramon Paz .7 mL/hr at 04/14/24 0617 1,250 mg at 04/14/24 0617        Review of patient's allergies indicates:  No Known Allergies  Objective:     Vital Signs (24h Range):  Temp:  [97.6 °F (36.4 °C)-98.5 °F (36.9 °C)] 97.6 °F (36.4 °C)  Pulse:  [53-62] 58  Resp:  [16-19] 19  SpO2:  [95 %-98 %] 95 %  BP: (108-131)/(65-80) 131/79       Lines/Drains/Airways       Peripheral Intravenous Line  Duration                  Peripheral IV - Single Lumen 04/12/24 2057 20 G Anterior;Proximal;Right Forearm 1 day                     Physical Exam      NAD  Awake and alert  Head atraumatic   Auricles WNL AU  Eyes  -Right: No edema, EOMI, VA grossly intact, pupil reactive  -Left: Upper/lower lid edema (improved), periorbital edema and erythema (improved), not TTP, no palpable fluctuance, inferior and lateral chemosis - essentially resolved, conjunctival erythema, EOMI  Nose w/ normal external appearance, no rhinorrhea  MMM, anterior tongue mobile, FOM soft, OP patent w/ midline uvula   Neck soft, not TTP, normal ROM, no LAD  Normal WOB, no stridor or stertor    Significant Labs:  CBC:   Recent Labs   Lab 04/14/24  0515   WBC 8.45   RBC 4.52*   HGB 14.6   HCT 44.0      MCV 97   MCH 32.3*   MCHC 33.2       Significant Diagnostics:  CT: I have reviewed all pertinent results/findings within the past 24 hours and my personal findings are:  Decreased size of orbital abscess that site medial to lateral canthus. Edema vs. Abscess of lacrimal gland   Assessment/Plan:     * Orbital cellulitis on left  58M with left orbital cellulitis and small abscess. Repeat CT Orbits reviewed - decreased size of orbital abscess that lies medial to lateral canthus. Also shows edema vs. Abscess of lacrimal gland. Patient's exam continues to improve. Abscess not palpable on exam.     -no acute ENT intervention  -Recommend continuing antibiotics per ophtho  -Other interventions per ophtho  -ENT to sign off        Dyllan Watts MD  Otorhinolaryngology-Head & Neck Surgery  [facility] - Med Surg ([facility]

## 2024-04-14 NOTE — PROGRESS NOTES
[facility] - [facility] ([address] Medicine  Progress Note    Patient Name: Adin Copeland  MRN: [id number]  Patient Class: IP- Inpatient   Admission Date: 4/12/2024  Length of Stay: 2 days  Attending Physician: Tereso Patel MD  Primary Care Provider: Delia, Primary Doctor        Subjective:     Principal Problem:Orbital cellulitis on left        HPI:  Adin Copeland is a 58 y.o. male with a PMHx of nicotine dependence who presents to the ED as a transfer from [facility] for ophthalmology evaluation for orbital cellulitis with abscess. Labs largely unremarkable CT orbits w/ contrast revealed findings consistent with left orbital cellulitis with a 10 x 4 mm fluid collection anterior to the left lobe suspicious for an abscess. There is no post septal extension. The patient received 1.5g vancomycin and 2g Rocephin prior to transfer. The patient reports he noted mild upper eyelid pain 3 days ago. He took ASA and went to bed. He reports the next morning he noted left eye redness, swelling, and drainage. He reports it worsened today. Denies any trauam to the eye. He denies vision changes, headache, fever, or chills. He denies SOB, CP, N/V/D, abdominal pain, or dysuria. The patient endorses chronic smoker's cough that is unchanged from baseline.    In the ED, VSS, afebrile. CBC unremarkable. Glucose 122. Albumin 3.2. AST/ALT 50/46. CRP 2.5. Hep C Ab reactive. The patient received Toradol. Ophthalmology evaluated the patient and recommended vancomycin and unasyn, repeat CT orbits w/wo contrast tomorrow, and ENT evaluation for possible abscess drainage.    Overview/Hospital Course:  No notes on file    Interval History: Looks much better, possible dc home today.    Review of Systems  Objective:     Vital Signs (Most Recent):  Temp: 97.6 °F (36.4 °C) (04/14/24 0804)  Pulse: (!) 51 (04/14/24 0804)  Resp: 19 (04/14/24 0450)  BP: 133/70 (04/14/24 0804)  SpO2: 96 % (04/14/24 0804) Vital Signs (24h Range):  Temp:  [97.6 °F (36.4  °C)-98.4 °F (36.9 °C)] 97.6 °F (36.4 °C)  Pulse:  [51-62] 51  Resp:  [16-19] 19  SpO2:  [95 %-98 %] 96 %  BP: (108-133)/(65-80) 133/70     Weight: 78.2 kg (172 lb 6.4 oz)  Body mass index is 21.55 kg/m².    Intake/Output Summary (Last 24 hours) at 4/14/2024 0922  Last data filed at 4/14/2024 0629  Gross per 24 hour   Intake 980 ml   Output 400 ml   Net 580 ml         Physical Exam  Vitals and nursing note reviewed.   Constitutional:       General: He is not in acute distress.     Appearance: He is not toxic-appearing or diaphoretic.   HENT:      Head: Normocephalic and atraumatic. Left periorbital erythema present.      Nose: Nose normal.      Mouth/Throat:      Mouth: Mucous membranes are moist.   Eyes:      General:         Left eye: Discharge present.     Extraocular Movements: Extraocular movements intact.      Conjunctiva/sclera:      Left eye: Left conjunctiva is injected. Chemosis present.      Pupils: Pupils are equal, round, and reactive to light.      Comments: Erythema and swelling of the upper and lower lids with chemosis. No significant pain with extraocular movements. See photos   Cardiovascular:      Rate and Rhythm: Normal rate and regular rhythm.      Pulses: Normal pulses.   Pulmonary:      Effort: Pulmonary effort is normal. No respiratory distress.      Breath sounds: No wheezing, rhonchi or rales.   Abdominal:      General: Bowel sounds are normal. There is no distension.      Palpations: Abdomen is soft.      Tenderness: There is no abdominal tenderness. There is no guarding.   Musculoskeletal:         General: Normal range of motion.      Cervical back: Normal range of motion.   Skin:     General: Skin is warm and dry.      Capillary Refill: Capillary refill takes less than 2 seconds.   Neurological:      General: No focal deficit present.      Mental Status: He is alert and oriented to person, place, and time.      Sensory: No sensory deficit.      Motor: No weakness.   Psychiatric:          Mood and Affect: Mood normal.         Behavior: Behavior normal.             Significant Labs: All pertinent labs within the past 24 hours have been reviewed.  BMP:   Recent Labs   Lab 04/14/24  0515   GLU 81      K 4.0      CO2 22*   BUN 9   CREATININE 0.7   CALCIUM 9.0   MG 1.9       Significant Imaging: I have reviewed all pertinent imaging results/findings within the past 24 hours.    Assessment/Plan:      * Orbital cellulitis on left  -Afebrile, no leukocytosis.  -CRP 2.5.  -CT orbits w/ contrast at OSH reveals findings consistent with left orbital cellulitis with a 10 x 4 mm fluid collection anterior to the left lobe suspicious for an abscess. There is no post septal extension.   -The patient received vancomycin and rocephin prior to transfer, will change to unasyn and vancomycin per ophthalmology recs.  -Repeat CT orbits w/ contrast tomorrow per ophthalmology recs.  -ENT consulted per ophthalmology recs as there is no oculo-plastics staff available at this time to drain abscess, appreciate assistance.  -NPO after midnight pending ENT evaluation.  -PRN pain control.    Elevated LFTs  -AST/ALT 50/46 on admit, Tbili 0.4.  -Hep C Ab reactive.   -RUQ abd US pending.  -Continue to monitor on daily labs.    Hepatitis C antibody test positive  -Hep C Ab reactive on admit, Quantitative PCR in process.  -Pending results patient may need outpatient hepatology follow up, which I discussed with the patient.    Cigarette nicotine dependence without complication  Dangers of cigarette smoking were reviewed with patient in detail. Patient was Counseled for 3-10 minutes. Nicotine replacement options were discussed. Nicotine replacement was discussed- not prescribed per patient's request      VTE Risk Mitigation (From admission, onward)           Ordered     IP VTE LOW RISK PATIENT  Once         04/13/24 0247     Place sequential compression device  Until discontinued         04/12/24 6777                     Discharge Planning   CLYDE:      Code Status: Full Code   Is the patient medically ready for discharge?:     Reason for patient still in hospital (select all that apply): Patient unstable                     Tereso Patel MD  Department of Hospital Medicine   [facility] Surg ([place])

## 2024-04-14 NOTE — SUBJECTIVE & OBJECTIVE
Interval History: NAEO. CT Orbits done overnight. Reports continued improvement of symptoms. Reports some thick lacrimation from left eye    Medications:  Continuous Infusions:  Current Facility-Administered Medications   Medication Dose Route Frequency Provider Last Rate Last Admin    acetaminophen tablet 650 mg  650 mg Oral Q6H PRN Marysol Packer, NP        ampicillin-sulbactam (UNASYN) 3 g in sodium chloride 0.9 % 100 mL IVPB (MB+)  3 g Intravenous Q6H Tereso Patel MD   Stopped at 04/14/24 0227    dextrose 10% bolus 125 mL 125 mL  12.5 g Intravenous PRN Marysol Packer, NP        dextrose 10% bolus 250 mL 250 mL  25 g Intravenous PRN Marysol Packer, NP        glucagon (human recombinant) injection 1 mg  1 mg Intramuscular PRN Marysol Packer, NP        glucose chewable tablet 16 g  16 g Oral PRN Marysol Packer, NP        glucose chewable tablet 24 g  24 g Oral PRN Mayrsol Packer NP        melatonin tablet 6 mg  6 mg Oral Nightly PRN Mary Reza PA-C        naloxone 0.4 mg/mL injection 0.02 mg  0.02 mg Intravenous PRN Marysol Packer NP        ondansetron injection 4 mg  4 mg Intravenous Q8H PRN Marysol Packer, ROMERO        polyethylene glycol packet 17 g  17 g Oral Daily Raphael Suazo, DO        senna-docusate 8.6-50 mg per tablet 2 tablet  2 tablet Oral BID PRN Raphael Suazo DO   2 tablet at 04/14/24 0616    sodium chloride 0.9% flush 10 mL  10 mL Intravenous PRN Thuan-Mary Gallardo PA-C        sodium chloride 0.9% flush 10 mL  10 mL Intravenous Q12H PRN Marysol Packer NP        vancomycin - pharmacy to dose   Intravenous pharmacy to manage frequency Marysol Packer NP        vancomycin 1,250 mg in dextrose 5 % (D5W) 250 mL IVPB (Vial-Mate)  15 mg/kg Intravenous Q12H Ramon Paz .7 mL/hr at 04/14/24 0617 1,250 mg at 04/14/24 0617     Scheduled Meds:  Current Facility-Administered Medications   Medication Dose Route Frequency Provider Last Rate  Last Admin    acetaminophen tablet 650 mg  650 mg Oral Q6H PRN Marysol Packer, NP        ampicillin-sulbactam (UNASYN) 3 g in sodium chloride 0.9 % 100 mL IVPB (MB+)  3 g Intravenous Q6H Tereso Patel MD   Stopped at 04/14/24 0227    dextrose 10% bolus 125 mL 125 mL  12.5 g Intravenous PRN Marysol Packer, NP        dextrose 10% bolus 250 mL 250 mL  25 g Intravenous PRN Marysol Packer, NP        glucagon (human recombinant) injection 1 mg  1 mg Intramuscular PRN Marysol Packer, NP        glucose chewable tablet 16 g  16 g Oral PRN Marysol Packer, NP        glucose chewable tablet 24 g  24 g Oral PRN Marysol Packer, NP        melatonin tablet 6 mg  6 mg Oral Nightly PRN Thuan-LinkMary PA-C        naloxone 0.4 mg/mL injection 0.02 mg  0.02 mg Intravenous PRN Marysol Packer NP        ondansetron injection 4 mg  4 mg Intravenous Q8H PRN Marysol Packer, ROMERO        polyethylene glycol packet 17 g  17 g Oral Daily Raphael Suazo DO        senna-docusate 8.6-50 mg per tablet 2 tablet  2 tablet Oral BID PRN Raphael Suazo,    2 tablet at 04/14/24 0616    sodium chloride 0.9% flush 10 mL  10 mL Intravenous PRN Yunsone-LinkMary PA-C        sodium chloride 0.9% flush 10 mL  10 mL Intravenous Q12H PRN Marysol Packer NP        vancomycin - pharmacy to dose   Intravenous pharmacy to manage frequency Marysol Packer NP        vancomycin 1,250 mg in dextrose 5 % (D5W) 250 mL IVPB (Vial-Mate)  15 mg/kg Intravenous Q12H Ramon Paz .7 mL/hr at 04/14/24 0617 1,250 mg at 04/14/24 0617     PRN Meds:  Current Facility-Administered Medications   Medication Dose Route Frequency Provider Last Rate Last Admin    acetaminophen tablet 650 mg  650 mg Oral Q6H PRN Marysol Packer, NP        ampicillin-sulbactam (UNASYN) 3 g in sodium chloride 0.9 % 100 mL IVPB (MB+)  3 g Intravenous Q6H Tereso Patel MD   Stopped at 04/14/24 0227    dextrose 10% bolus 125 mL 125 mL  12.5 g  Intravenous PRN Marysol Packer, NP        dextrose 10% bolus 250 mL 250 mL  25 g Intravenous PRN Marysol Packer NP        glucagon (human recombinant) injection 1 mg  1 mg Intramuscular PRN Marysol Packer NP        glucose chewable tablet 16 g  16 g Oral PRN Marysol Packer NP        glucose chewable tablet 24 g  24 g Oral PRN Marysol Packer NP        melatonin tablet 6 mg  6 mg Oral Nightly PRN Mary Reza PA-C        naloxone 0.4 mg/mL injection 0.02 mg  0.02 mg Intravenous PRN Marysol Packer NP        ondansetron injection 4 mg  4 mg Intravenous Q8H PRN Marysol Packer NP        polyethylene glycol packet 17 g  17 g Oral Daily Raphael Suazo DO        senna-docusate 8.6-50 mg per tablet 2 tablet  2 tablet Oral BID PRN Raphael Suazo DO   2 tablet at 04/14/24 0616    sodium chloride 0.9% flush 10 mL  10 mL Intravenous PRN Mary Reza PA-C        sodium chloride 0.9% flush 10 mL  10 mL Intravenous Q12H PRN Marysol Packer NP        vancomycin - pharmacy to dose   Intravenous pharmacy to manage frequency Marysol Packer NP        vancomycin 1,250 mg in dextrose 5 % (D5W) 250 mL IVPB (Vial-Mate)  15 mg/kg Intravenous Q12H Ramon Paz .7 mL/hr at 04/14/24 0617 1,250 mg at 04/14/24 0617        Review of patient's allergies indicates:  No Known Allergies  Objective:     Vital Signs (24h Range):  Temp:  [97.6 °F (36.4 °C)-98.5 °F (36.9 °C)] 97.6 °F (36.4 °C)  Pulse:  [53-62] 58  Resp:  [16-19] 19  SpO2:  [95 %-98 %] 95 %  BP: (108-131)/(65-80) 131/79       Lines/Drains/Airways       Peripheral Intravenous Line  Duration                  Peripheral IV - Single Lumen 04/12/24 2056 20 G Anterior;Proximal;Right Forearm 1 day                     Physical Exam     NAD  Awake and alert  Head atraumatic   Auricles WNL AU  Eyes  -Right: No edema, EOMI, VA grossly intact, pupil reactive  -Left: Upper/lower lid edema (improved), periorbital edema and erythema  (improved), not TTP, no palpable fluctuance, inferior and lateral chemosis - essentially resolved, conjunctival erythema, EOMI  Nose w/ normal external appearance, no rhinorrhea  MMM, anterior tongue mobile, FOM soft, OP patent w/ midline uvula   Neck soft, not TTP, normal ROM, no LAD  Normal WOB, no stridor or stertor    Significant Labs:  CBC:   Recent Labs   Lab 04/14/24  0515   WBC 8.45   RBC 4.52*   HGB 14.6   HCT 44.0      MCV 97   MCH 32.3*   MCHC 33.2       Significant Diagnostics:  CT: I have reviewed all pertinent results/findings within the past 24 hours and my personal findings are:  Decreased size of orbital abscess that site medial to lateral canthus. Edema vs. Abscess of lacrimal gland

## 2024-04-14 NOTE — PROGRESS NOTES
Pharmacokinetic Assessment Follow Up: IV Vancomycin    Vancomycin serum concentration assessment(s):  - The vancomycin trough level, drawn at ~ 11.5 hours after the last dose, was 15.3 mcg/mL which is within the goal range.   - The patient's renal function remains stable at this time.     Vancomycin Regimen Plan:  - Continue the current dosing regimen of 1250 mg (16 mg/kg) IV every 12 hours.   - Obtain a vancomycin trough level for surveillance on 4/16 @ 0500 or sooner if clinically warranted to assess the stability of the vancomycin levels.   - Monitor for signs and symptoms of nephrotoxicity and infusion reactions.     Drug levels (last 3 results):  Recent Labs   Lab Result Units 04/14/24  0515   Vancomycin-Trough ug/mL 15.3       Pharmacy will continue to follow and monitor vancomycin.    Please contact pharmacy at extension [phone] for questions regarding this assessment.    Thank you for the consult,   Ami Parks       Patient brief summary:  Adin Copeland is a 58 y.o. male initiated on antimicrobial therapy with IV Vancomycin for treatment of orbital cellulitis.    The patient's current regimen is 1250 mg IV every 12 hours.     Drug Allergies:   Review of patient's allergies indicates:  No Known Allergies    Actual Body Weight:   78.2 kg    Renal Function:   Estimated Creatinine Clearance: 128.8 mL/min (based on SCr of 0.7 mg/dL).,     CBC (last 72 hours):  Recent Labs   Lab Result Units 04/12/24 2052 04/13/24 0419 04/14/24  0515   WBC K/uL 11.82 9.88 8.45   Hemoglobin g/dL 14.5 14.0 14.6   Hemoglobin A1C % 5.4  --   --    Hematocrit % 42.8 42.6 44.0   Platelets K/uL 249 227 234   Gran % % 61.3 53.0 39.4   Lymph % % 29.9 36.3 49.2*   Mono % % 6.9 8.3 8.2   Eosinophil % % 1.1 1.4 2.1   Basophil % % 0.6 0.6 0.7   Differential Method  Automated Automated Automated       Metabolic Panel (last 72 hours):  Recent Labs   Lab Result Units 04/12/24 2052 04/13/24 0419 04/13/24  1304 04/14/24  0515   Sodium mmol/L 141  139  --  136   Potassium mmol/L 3.8 3.7  --  4.0   Chloride mmol/L 109 108  --  107   CO2 mmol/L 24 23  --  22*   Glucose mg/dL 122* 76  --  81   Glucose, UA   --   --  Negative  --    BUN mg/dL 8 8  --  9   Creatinine mg/dL 0.8 0.8  --  0.7   Albumin g/dL 3.2* 3.1*  --  3.1*   Total Bilirubin mg/dL 0.4 0.7  --  0.6   Alkaline Phosphatase U/L 53* 51*  --  50*   AST U/L 50* 47*  --  57*   ALT U/L 46* 45*  --  52*   Magnesium mg/dL  --  1.8  --  1.9       Vancomycin Administrations:  vancomycin given in the last 96 hours                     vancomycin 1,250 mg in dextrose 5 % (D5W) 250 mL IVPB (Vial-Mate) (mg) 1,250 mg New Bag 04/14/24 0617     1,250 mg New Bag 04/13/24 1742     1,250 mg New Bag  0730                    Microbiologic Results:  Microbiology Results (last 7 days)       ** No results found for the last 168 hours. **           120

## 2024-04-14 NOTE — SUBJECTIVE & OBJECTIVE
Interval History: Looks much better, possible dc home today.    Review of Systems  Objective:     Vital Signs (Most Recent):  Temp: 97.6 °F (36.4 °C) (04/14/24 0804)  Pulse: (!) 51 (04/14/24 0804)  Resp: 19 (04/14/24 0450)  BP: 133/70 (04/14/24 0804)  SpO2: 96 % (04/14/24 0804) Vital Signs (24h Range):  Temp:  [97.6 °F (36.4 °C)-98.4 °F (36.9 °C)] 97.6 °F (36.4 °C)  Pulse:  [51-62] 51  Resp:  [16-19] 19  SpO2:  [95 %-98 %] 96 %  BP: (108-133)/(65-80) 133/70     Weight: 78.2 kg (172 lb 6.4 oz)  Body mass index is 21.55 kg/m².    Intake/Output Summary (Last 24 hours) at 4/14/2024 0922  Last data filed at 4/14/2024 0629  Gross per 24 hour   Intake 980 ml   Output 400 ml   Net 580 ml         Physical Exam  Vitals and nursing note reviewed.   Constitutional:       General: He is not in acute distress.     Appearance: He is not toxic-appearing or diaphoretic.   HENT:      Head: Normocephalic and atraumatic. Left periorbital erythema present.      Nose: Nose normal.      Mouth/Throat:      Mouth: Mucous membranes are moist.   Eyes:      General:         Left eye: Discharge present.     Extraocular Movements: Extraocular movements intact.      Conjunctiva/sclera:      Left eye: Left conjunctiva is injected. Chemosis present.      Pupils: Pupils are equal, round, and reactive to light.      Comments: Erythema and swelling of the upper and lower lids with chemosis. No significant pain with extraocular movements. See photos   Cardiovascular:      Rate and Rhythm: Normal rate and regular rhythm.      Pulses: Normal pulses.   Pulmonary:      Effort: Pulmonary effort is normal. No respiratory distress.      Breath sounds: No wheezing, rhonchi or rales.   Abdominal:      General: Bowel sounds are normal. There is no distension.      Palpations: Abdomen is soft.      Tenderness: There is no abdominal tenderness. There is no guarding.   Musculoskeletal:         General: Normal range of motion.      Cervical back: Normal range of  motion.   Skin:     General: Skin is warm and dry.      Capillary Refill: Capillary refill takes less than 2 seconds.   Neurological:      General: No focal deficit present.      Mental Status: He is alert and oriented to person, place, and time.      Sensory: No sensory deficit.      Motor: No weakness.   Psychiatric:         Mood and Affect: Mood normal.         Behavior: Behavior normal.             Significant Labs: All pertinent labs within the past 24 hours have been reviewed.  BMP:   Recent Labs   Lab 04/14/24  0515   GLU 81      K 4.0      CO2 22*   BUN 9   CREATININE 0.7   CALCIUM 9.0   MG 1.9       Significant Imaging: I have reviewed all pertinent imaging results/findings within the past 24 hours.

## 2024-04-14 NOTE — PROGRESS NOTES
[facility] - Med Surg ([address] Medicine  Progress Note    Patient Name: Adin Copeland  MRN: [id number]  Patient Class: IP- Inpatient   Admission Date: 4/12/2024  Length of Stay: 2 days  Attending Physician: Tereso Patel MD  Primary Care Provider: Delia, Primary Doctor        Subjective:     Principal Problem:Orbital cellulitis on left        HPI:  Adin Copeland is a 58 y.o. male with a PMHx of nicotine dependence who presents to the ED as a transfer from [facility] for ophthalmology evaluation for orbital cellulitis with abscess. Labs largely unremarkable CT orbits w/ contrast revealed findings consistent with left orbital cellulitis with a 10 x 4 mm fluid collection anterior to the left lobe suspicious for an abscess. There is no post septal extension. The patient received 1.5g vancomycin and 2g Rocephin prior to transfer. The patient reports he noted mild upper eyelid pain 3 days ago. He took ASA and went to bed. He reports the next morning he noted left eye redness, swelling, and drainage. He reports it worsened today. Denies any trauam to the eye. He denies vision changes, headache, fever, or chills. He denies SOB, CP, N/V/D, abdominal pain, or dysuria. The patient endorses chronic smoker's cough that is unchanged from baseline.    In the ED, VSS, afebrile. CBC unremarkable. Glucose 122. Albumin 3.2. AST/ALT 50/46. CRP 2.5. Hep C Ab reactive. The patient received Toradol. Ophthalmology evaluated the patient and recommended vancomycin and unasyn, repeat CT orbits w/wo contrast tomorrow, and ENT evaluation for possible abscess drainage.    Overview/Hospital Course:  No notes on file    No new subjective & objective note has been filed under this hospital service since the last note was generated.      Assessment/Plan:      * Orbital cellulitis on left  -Afebrile, no leukocytosis.  -CRP 2.5.  -CT orbits w/ contrast at OSH reveals findings consistent with left orbital cellulitis with a 10 x 4 mm fluid  collection anterior to the left lobe suspicious for an abscess. There is no post septal extension.   -The patient received vancomycin and rocephin prior to transfer, will change to unasyn and vancomycin per ophthalmology recs.  -Repeat CT orbits w/ contrast tomorrow per ophthalmology recs.  -ENT consulted per ophthalmology recs as there is no oculo-plastics staff available at this time to drain abscess, appreciate assistance.  -NPO after midnight pending ENT evaluation.  -PRN pain control.    Elevated LFTs  -AST/ALT 50/46 on admit, Tbili 0.4.  -Hep C Ab reactive.   -RUQ abd US pending.  -Continue to monitor on daily labs.    Hepatitis C antibody test positive  -Hep C Ab reactive on admit, Quantitative PCR in process.  -Pending results patient may need outpatient hepatology follow up, which I discussed with the patient.    Cigarette nicotine dependence without complication  Dangers of cigarette smoking were reviewed with patient in detail. Patient was Counseled for 3-10 minutes. Nicotine replacement options were discussed. Nicotine replacement was discussed- not prescribed per patient's request      VTE Risk Mitigation (From admission, onward)           Ordered     IP VTE LOW RISK PATIENT  Once         04/13/24 0247     Place sequential compression device  Until discontinued         04/12/24 2228                    Discharge Planning   CLYDE:      Code Status: Full Code   Is the patient medically ready for discharge?:     Reason for patient still in hospital (select all that apply): Patient unstable                     Tereso Patel MD  Department of Hospital Medicine   [facility] ([place])

## 2024-04-14 NOTE — PLAN OF CARE
SW submitted patient information to [facility] department requesting that patient be assisted w/ medicaid screening.          MARCO Viramontes, LMSW  [facility]  Case Management  Ext. [phone]

## 2024-04-14 NOTE — PROGRESS NOTES
progress Report  Ophthalmology Service    Date: 04/14/2024    Chief complaint/Reason for Consult: maisha-orbital cellulitis     History of Present Illness: Adin Copeland is a 58 y.o. male who presents with 3 days of worsening left eye swelling. Started 2 days ago, and has gotten progressively worse. Minimal pain, and minimal tenderness. Denies vision changes, changes with extra-ocular movements, diplopia, fevers chills. Presented to [facility] ER because was worried about swelling.    Interval History:  4/14/24: improved swelling per patient. No pain, no vision changes.     POcularHx: No history of ocular problems or past ocular surgeries.  Does not use glasses or contacts.    Current eye gtts: none      PMHx:  has no past medical history on file.     PSurgHx:  has a past surgical history that includes Tonsillectomy and Hernia repair.     Home Medications:   Prior to Admission medications    Not on File        Medications this encounter:     Allergies: has No Known Allergies.     Social:  reports that he has been smoking cigarettes. He started smoking about 42 years ago. He has a 42.3 pack-year smoking history. He does not have any smokeless tobacco history on file. He reports that he does not currently use alcohol. He reports that he does not use drugs.     Family Hx: No family history of glaucoma, macular degeneration, or blindness. family history is not on file.     ROS: see hpi     Ocular examination/Dilated fundus examination:  Base Eye Exam       Visual Acuity (Snellen - Linear)         Right Left    Dist sc  20/25              Tonometry (Tonopen, 2:26 PM)         Right Left    Pressure  15              Extraocular Movement         Right Left     Full, Ortho Full, Ortho                  Slit Lamp and Fundus Exam       External Exam         Right Left    External Normal normal              Slit Lamp Exam         Right Left    Lids/Lashes Normal normal    Conjunctiva/Sclera White and quiet Chemosis resolved, 1+ injection     Cornea Clear Clear    Anterior Chamber Deep and quiet Deep and quiet    Iris Round and reactive Round and reactive    Lens Clear Clear    Anterior Vitreous Normal Normal                            CT orbits:  Impression:     Asymmetrical enlargement of the left lacrimal gland concerning dacryoadenitis with possible subcentimeter intraglandular abscess.  Given abnormal appearing lacrimal gland posterior to the orbital septum, technically meets criteria for extraconal orbital cellulitis.     Associated left periorbital soft tissue swelling and chemosis.  =======================================    Assessment/Plan:   1. Dacryoadenitis with lacrimal gland abscess  - presents with three days of worsening left maisha-orbital swelling  - minimal pain, no proptosis, EOM full, normal vision and IOP, no rAPD  - CT scan from outside hospital concerning for abscess lateral to globe, but no read sent with scan  - no fluctuance felt at bedside, unable to access abscess at bedside  - repeat CT with dacryoadenitis with 6mm fluid collection consistent with abscess  - seen by ENT, defer possible abscess drainage to them as no oculoplastics staff available at this time  - ophthalmology will continue to follow  - 4/14/24: improving on IV antibiotics. Appears back to baseline on external exam, though repeat CT last night showed continued lacrimal gland inflammation with abscess. Continue IV antibiotics for one more day.    Discussed w/ Dr. Jackson Evangelista MD  PGY-2, Ophthalmology  04/14/2024  10:16 PM     No

## 2024-04-14 NOTE — ASSESSMENT & PLAN NOTE
58M with left orbital cellulitis and small abscess. Repeat CT Orbits reviewed - decreased size of orbital abscess that lies medial to lateral canthus. Also shows edema vs. Abscess of lacrimal gland. Patient's exam continues to improve. Abscess not palpable on exam.     -no acute ENT intervention  -Recommend continuing antibiotics per ophtho  -Other interventions per ophtho  -ENT to sign off

## 2024-04-15 VITALS
TEMPERATURE: 99 F | HEIGHT: 75 IN | WEIGHT: 172.38 LBS | SYSTOLIC BLOOD PRESSURE: 129 MMHG | DIASTOLIC BLOOD PRESSURE: 88 MMHG | BODY MASS INDEX: 21.43 KG/M2 | OXYGEN SATURATION: 95 % | RESPIRATION RATE: 16 BRPM | HEART RATE: 60 BPM

## 2024-04-15 LAB
ALBUMIN SERPL BCP-MCNC: 3.2 G/DL (ref 3.5–5.2)
ALP SERPL-CCNC: 52 U/L (ref 55–135)
ALT SERPL W/O P-5'-P-CCNC: 59 U/L (ref 10–44)
ANION GAP SERPL CALC-SCNC: 7 MMOL/L (ref 8–16)
AST SERPL-CCNC: 63 U/L (ref 10–40)
BASOPHILS # BLD AUTO: 0.06 K/UL (ref 0–0.2)
BASOPHILS NFR BLD: 0.7 % (ref 0–1.9)
BILIRUB SERPL-MCNC: 0.6 MG/DL (ref 0.1–1)
BUN SERPL-MCNC: 13 MG/DL (ref 6–20)
CALCIUM SERPL-MCNC: 9.2 MG/DL (ref 8.7–10.5)
CHLORIDE SERPL-SCNC: 107 MMOL/L (ref 95–110)
CO2 SERPL-SCNC: 22 MMOL/L (ref 23–29)
CREAT SERPL-MCNC: 0.8 MG/DL (ref 0.5–1.4)
DIFFERENTIAL METHOD BLD: ABNORMAL
EOSINOPHIL # BLD AUTO: 0.2 K/UL (ref 0–0.5)
EOSINOPHIL NFR BLD: 2.5 % (ref 0–8)
ERYTHROCYTE [DISTWIDTH] IN BLOOD BY AUTOMATED COUNT: 13 % (ref 11.5–14.5)
EST. GFR  (NO RACE VARIABLE): >60 ML/MIN/1.73 M^2
GLUCOSE SERPL-MCNC: 81 MG/DL (ref 70–110)
HCT VFR BLD AUTO: 45.1 % (ref 40–54)
HCV RNA SERPL NAA+PROBE-LOG IU: 6.55 LOGIU/ML
HCV RNA SERPL QL NAA+PROBE: DETECTED
HCV RNA SPEC NAA+PROBE-ACNC: ABNORMAL IU/ML
HGB BLD-MCNC: 14.9 G/DL (ref 14–18)
IMM GRANULOCYTES # BLD AUTO: 0.03 K/UL (ref 0–0.04)
IMM GRANULOCYTES NFR BLD AUTO: 0.3 % (ref 0–0.5)
LYMPHOCYTES # BLD AUTO: 3.8 K/UL (ref 1–4.8)
LYMPHOCYTES NFR BLD: 43.8 % (ref 18–48)
MAGNESIUM SERPL-MCNC: 1.9 MG/DL (ref 1.6–2.6)
MCH RBC QN AUTO: 32.3 PG (ref 27–31)
MCHC RBC AUTO-ENTMCNC: 33 G/DL (ref 32–36)
MCV RBC AUTO: 98 FL (ref 82–98)
MONOCYTES # BLD AUTO: 0.7 K/UL (ref 0.3–1)
MONOCYTES NFR BLD: 8.5 % (ref 4–15)
NEUTROPHILS # BLD AUTO: 3.8 K/UL (ref 1.8–7.7)
NEUTROPHILS NFR BLD: 44.2 % (ref 38–73)
NRBC BLD-RTO: 0 /100 WBC
PLATELET # BLD AUTO: 242 K/UL (ref 150–450)
PMV BLD AUTO: 10.1 FL (ref 9.2–12.9)
POTASSIUM SERPL-SCNC: 3.9 MMOL/L (ref 3.5–5.1)
PROT SERPL-MCNC: 6.7 G/DL (ref 6–8.4)
RBC # BLD AUTO: 4.61 M/UL (ref 4.6–6.2)
SODIUM SERPL-SCNC: 136 MMOL/L (ref 136–145)
WBC # BLD AUTO: 8.67 K/UL (ref 3.9–12.7)

## 2024-04-15 PROCEDURE — 63600175 PHARM REV CODE 636 W HCPCS: Performed by: HOSPITALIST

## 2024-04-15 PROCEDURE — 25000003 PHARM REV CODE 250: Performed by: HOSPITALIST

## 2024-04-15 PROCEDURE — 80053 COMPREHEN METABOLIC PANEL: CPT | Performed by: NURSE PRACTITIONER

## 2024-04-15 PROCEDURE — 63600175 PHARM REV CODE 636 W HCPCS: Performed by: INTERNAL MEDICINE

## 2024-04-15 PROCEDURE — 85025 COMPLETE CBC W/AUTO DIFF WBC: CPT | Performed by: NURSE PRACTITIONER

## 2024-04-15 PROCEDURE — 25000003 PHARM REV CODE 250: Performed by: INTERNAL MEDICINE

## 2024-04-15 PROCEDURE — 36415 COLL VENOUS BLD VENIPUNCTURE: CPT | Performed by: NURSE PRACTITIONER

## 2024-04-15 PROCEDURE — 83735 ASSAY OF MAGNESIUM: CPT | Performed by: NURSE PRACTITIONER

## 2024-04-15 RX ORDER — SULFAMETHOXAZOLE AND TRIMETHOPRIM 800; 160 MG/1; MG/1
1 TABLET ORAL 2 TIMES DAILY
Qty: 28 TABLET | Refills: 0 | Status: SHIPPED | OUTPATIENT
Start: 2024-04-15 | End: 2024-04-29

## 2024-04-15 RX ORDER — AMOXICILLIN AND CLAVULANATE POTASSIUM 875; 125 MG/1; MG/1
1 TABLET, FILM COATED ORAL EVERY 12 HOURS
Qty: 28 TABLET | Refills: 0 | Status: SHIPPED | OUTPATIENT
Start: 2024-04-15 | End: 2024-04-29

## 2024-04-15 RX ADMIN — POLYETHYLENE GLYCOL 3350 17 G: 17 POWDER, FOR SOLUTION ORAL at 09:04

## 2024-04-15 RX ADMIN — VANCOMYCIN HYDROCHLORIDE 1250 MG: 1.25 INJECTION, POWDER, LYOPHILIZED, FOR SOLUTION INTRAVENOUS at 05:04

## 2024-04-15 RX ADMIN — AMPICILLIN SODIUM AND SULBACTAM SODIUM 3 G: 2; 1 INJECTION, POWDER, FOR SOLUTION INTRAMUSCULAR; INTRAVENOUS at 01:04

## 2024-04-15 RX ADMIN — AMPICILLIN SODIUM AND SULBACTAM SODIUM 3 G: 2; 1 INJECTION, POWDER, FOR SOLUTION INTRAMUSCULAR; INTRAVENOUS at 07:04

## 2024-04-15 RX ADMIN — DOCUSATE SODIUM AND SENNOSIDES 2 TABLET: 8.6; 5 TABLET, FILM COATED ORAL at 09:04

## 2024-04-15 NOTE — PLAN OF CARE
SW received notification that patient may need assistance paying for dc meds. Per patient, cannot afford medication. SW reviewed w/ Denise ([facility]). Denise provided information for completion of cost-transfer in the amount of $20.00. BEAU submitted completed cost-transfer in the amount of $20.00 to Denise via email.     No additional post-acute needs.                    MARCO Viramontes, LMSW  [facility]  Case Management  Ext. [phone]

## 2024-04-15 NOTE — NURSING
On call for MD notified last night of IV infiltrated site at right AC. No new orders placed. Site is firm but not red or warm to touch. Heat packs placed. Pt stated having slight improvement.

## 2024-04-15 NOTE — PLAN OF CARE
CHW scheduled pcp f/u   Future Appointments   Date Time Provider Department Center   4/22/2024 10:30 AM Carlee Amezcua MD [facility]  [facility]

## 2024-04-15 NOTE — NURSING
Pt left via w/c to home in NAD.  IV and heart monitor removed.  Meds delivered to room via bedside pharmacy.  VSS and ate lunch before departure.  Follow-up with ophthalmology discussed.  DC instructions, follow-up, and medication changes discussed with pt.  Verbalized understanding and had no further questions.

## 2024-04-15 NOTE — PLAN OF CARE
[facility] - Med Surg ([facility])  Discharge Final Note    Primary Care Provider: No, Primary Doctor    Expected Discharge Date: 4/15/2024    Final Discharge Note (most recent)       Final Note - 04/15/24 1439          Final Note    Assessment Type Final Discharge Note (P)      Anticipated Discharge Disposition Home or Self Care (P)      What phone number can be called within the next 1-3 days to see how you are doing after discharge? [phone] (P)         Post-Acute Status    Post-Acute Authorization Other (P)      Coverage MEDICAID - PENDING MEDICAID - (P)                      Important Message from Medicare             Patient dc home.                        MARCO Viramontes, LMSW  [facility]  Case Management  Ext. [phone]

## 2024-04-15 NOTE — PLAN OF CARE
S/p 48 hours IV antibiotics, swelling resolved. Ok for discharge from ophthalmology perspective. Recommend 14 day course of augmentin 875/125 with added MRSA coverage, but defer choice of 2nd antibiotic to medicine. Discussed strict return precautions with patient including worsening swelling, pain, motility restriction, diplopia, vision changes. Will arrange for outpatient follow-up next week.    Franco Evangelista MD  Ophthalmology PGY-2

## 2024-04-15 NOTE — PLAN OF CARE
Problem: Adult Inpatient Plan of Care  Goal: Plan of Care Review  4/15/2024 0655 by Patricia Mensah RN  Outcome: Ongoing, Progressing  4/15/2024 0603 by Patricia Mensah RN  Outcome: Ongoing, Progressing  Goal: Patient-Specific Goal (Individualized)  4/15/2024 0655 by Patricia Mensah RN  Outcome: Ongoing, Progressing  4/15/2024 0603 by Patricia Mensah RN  Outcome: Ongoing, Progressing  Goal: Absence of Hospital-Acquired Illness or Injury  4/15/2024 0655 by Patricia Mensah RN  Outcome: Ongoing, Progressing  4/15/2024 0603 by Patricia Mensah RN  Outcome: Ongoing, Progressing  Goal: Optimal Comfort and Wellbeing  4/15/2024 0655 by Patricia Mensah RN  Outcome: Ongoing, Progressing  4/15/2024 0603 by Patricia Mensah RN  Outcome: Ongoing, Progressing  Goal: Readiness for Transition of Care  4/15/2024 0655 by Patricia Mensah RN  Outcome: Ongoing, Progressing  4/15/2024 0603 by Patricia Mensah RN  Outcome: Ongoing, Progressing     Problem: Infection  Goal: Absence of Infection Signs and Symptoms  4/15/2024 0655 by Patricia Mensah RN  Outcome: Ongoing, Progressing  4/15/2024 0603 by Patricia Mensah RN  Outcome: Ongoing, Progressing       Pt has been AAO x 4 throughout shift, VS stable. Medications and IV abx given. Pt with firm knot at right AC, non red or warm. Heat packs placed. No BM. No significant events during night. Safety has been maintained.

## 2024-04-16 ENCOUNTER — TELEPHONE (OUTPATIENT)
Dept: OPHTHALMOLOGY | Facility: CLINIC | Age: 59
End: 2024-04-16
Payer: COMMERCIAL

## 2024-04-16 NOTE — TELEPHONE ENCOUNTER
----- Message from Franco Evangelista MD sent at 4/16/2024 10:38 AM CDT -----  Hello,    Can we please get this patient follow up in triage clinic on early next week.    Thanks,  -Franco

## 2024-04-22 ENCOUNTER — OFFICE VISIT (OUTPATIENT)
Dept: INTERNAL MEDICINE | Facility: CLINIC | Age: 59
End: 2024-04-22
Payer: MEDICAID

## 2024-04-22 ENCOUNTER — TELEPHONE (OUTPATIENT)
Dept: HEPATOLOGY | Facility: CLINIC | Age: 59
End: 2024-04-22
Payer: COMMERCIAL

## 2024-04-22 VITALS
WEIGHT: 155.19 LBS | BODY MASS INDEX: 19.29 KG/M2 | HEART RATE: 92 BPM | HEIGHT: 75 IN | SYSTOLIC BLOOD PRESSURE: 102 MMHG | DIASTOLIC BLOOD PRESSURE: 72 MMHG | OXYGEN SATURATION: 95 %

## 2024-04-22 DIAGNOSIS — H05.012 ORBITAL CELLULITIS ON LEFT: ICD-10-CM

## 2024-04-22 DIAGNOSIS — B19.20 HEPATITIS C VIRUS INFECTION, UNSPECIFIED CHRONICITY: ICD-10-CM

## 2024-04-22 DIAGNOSIS — Z09 HOSPITAL DISCHARGE FOLLOW-UP: Primary | ICD-10-CM

## 2024-04-22 PROCEDURE — 99213 OFFICE O/P EST LOW 20 MIN: CPT | Mod: PBBFAC

## 2024-04-22 PROCEDURE — 1160F RVW MEDS BY RX/DR IN RCRD: CPT | Mod: CPTII,,,

## 2024-04-22 PROCEDURE — 99213 OFFICE O/P EST LOW 20 MIN: CPT | Mod: S$PBB,,,

## 2024-04-22 PROCEDURE — 3078F DIAST BP <80 MM HG: CPT | Mod: CPTII,,,

## 2024-04-22 PROCEDURE — 3008F BODY MASS INDEX DOCD: CPT | Mod: CPTII,,,

## 2024-04-22 PROCEDURE — 99999 PR PBB SHADOW E&M-EST. PATIENT-LVL III: CPT | Mod: PBBFAC,,,

## 2024-04-22 PROCEDURE — 3074F SYST BP LT 130 MM HG: CPT | Mod: CPTII,,,

## 2024-04-22 PROCEDURE — 3044F HG A1C LEVEL LT 7.0%: CPT | Mod: CPTII,,,

## 2024-04-22 PROCEDURE — 1159F MED LIST DOCD IN RCRD: CPT | Mod: CPTII,,,

## 2024-04-22 NOTE — PROGRESS NOTES
I have reviewed the notes, assessments, and/or procedures performed by the resident, I concur with her/his documentation of Adin Copeland.  Date of Service: 4/22/2024

## 2024-04-22 NOTE — PROGRESS NOTES
Internal Medicine Clinic Note    Subjective     Chief Complaint:  hospital follow up    History of Present Illness:  Mr. Adin Copeland is a 58 y.o. male with nicotine dependence presents for hospital follow up after admission for left orbital cellulitis with abscess. He was initially treated at [facility] ED and received vancomycin and rocephin prior to transfer to [facility]. Was seen by ophthalmology and started on vancomycin and Unasyn during admission from 4/12-4/15.      Noted lacrimal gland inflammation with 6 mm fluid collection on CT. Evaluated by ENT but no indication for drainage of abscees while admitted. Discharged after 48 hours IV abx with 14 day course of augmentin + bactrim. Opthal follow up tomorrow.  Has been taking antibiotics as prescribed and has no vision changes, eye pain, no discharge. He denies any migraines or facial pain from the orbital cellulitis. He is doing well overall and has no complaints in clinic today. He does not have a PCP and notes that he has not been to the doctor in many years.    LFTs elevated but stable during admission. Hepatitis C Ab positive with HCV quant significantly elevated. US abdomen was normal with no liver or gallbladder abnormalities.    Review of Systems   Constitutional:  Negative for chills, fever and malaise/fatigue.   HENT:  Negative for congestion and sore throat.    Eyes:  Positive for redness (immproved from admission). Negative for blurred vision, photophobia, pain and discharge.   Respiratory:  Negative for cough and shortness of breath.    Cardiovascular:  Negative for chest pain, palpitations and leg swelling.   Gastrointestinal:  Negative for abdominal pain, nausea and vomiting.   Genitourinary:  Negative for dysuria and hematuria.   Musculoskeletal:  Negative for falls, joint pain and myalgias.   Skin:  Negative for rash.   Neurological:  Negative for dizziness, weakness and headaches.       PAST HISTORY:     No past medical history on file.    Past Surgical  "History:   Procedure Laterality Date    HERNIA REPAIR      TONSILLECTOMY         No family history on file.    Social History     Socioeconomic History    Marital status: Significant Other   Tobacco Use    Smoking status: Every Day     Current packs/day: 1.00     Average packs/day: 1 pack/day for 42.3 years (42.3 ttl pk-yrs)     Types: Cigarettes     Start date: 1982   Substance and Sexual Activity    Alcohol use: Not Currently    Drug use: Never     MEDICATIONS & ALLERGIES:     Current Outpatient Medications   Medication Sig Dispense Refill    amoxicillin-clavulanate 875-125mg (AUGMENTIN) 875-125 mg per tablet Take 1 tablet by mouth every 12 (twelve) hours. for 14 days 28 tablet 0    sulfamethoxazole-trimethoprim 800-160mg (BACTRIM DS) 800-160 mg Tab Take 1 tablet by mouth 2 (two) times daily. for 14 days 28 tablet 0     No current facility-administered medications for this visit.       Review of patient's allergies indicates:  No Known Allergies    OBJECTIVE:     Vital Signs:  Vitals:    04/22/24 1031 04/22/24 1140   BP: 102/72    Pulse: 100 92   SpO2: 95%    Weight: 70.4 kg (155 lb 3.3 oz)    Height: 6' 3" (1.905 m)        Body mass index is 19.4 kg/m².     Physical Exam  Vitals reviewed.   Constitutional:       General: He is not in acute distress.     Appearance: Normal appearance.   HENT:      Head: Normocephalic and atraumatic.      Nose: Nose normal.      Mouth/Throat:      Mouth: Mucous membranes are moist.      Pharynx: Oropharynx is clear.   Eyes:      General:         Right eye: No discharge.         Left eye: No discharge.      Extraocular Movements: Extraocular movements intact.      Pupils: Pupils are equal, round, and reactive to light.      Comments: Left eye conjunctival erythema - improved from admission   Cardiovascular:      Rate and Rhythm: Normal rate and regular rhythm.      Pulses: Normal pulses.      Heart sounds: Normal heart sounds.   Pulmonary:      Effort: Pulmonary effort is normal. No " "respiratory distress.      Breath sounds: Normal breath sounds.   Abdominal:      General: Bowel sounds are normal.      Palpations: Abdomen is soft.      Tenderness: There is no abdominal tenderness.   Musculoskeletal:         General: Normal range of motion.      Right lower leg: No edema.      Left lower leg: No edema.   Skin:     General: Skin is warm.   Neurological:      General: No focal deficit present.      Mental Status: He is alert and oriented to person, place, and time.   Psychiatric:         Mood and Affect: Mood normal.         Behavior: Behavior normal.         Laboratory  Lab Results   Component Value Date    WBC 8.67 04/15/2024    HGB 14.9 04/15/2024    HCT 45.1 04/15/2024    MCV 98 04/15/2024     04/15/2024     BMP  Lab Results   Component Value Date     04/15/2024    K 3.9 04/15/2024     04/15/2024    CO2 22 (L) 04/15/2024    BUN 13 04/15/2024    CREATININE 0.8 04/15/2024    CALCIUM 9.2 04/15/2024    ANIONGAP 7 (L) 04/15/2024    EGFRNORACEVR >60.0 04/15/2024     No results found for: "INR", "PROTIME"  Lab Results   Component Value Date    HGBA1C 5.4 04/12/2024       Diagnostic Results:  Labs: Reviewed  CT: Reviewed    Health Maintenance Due   Topic Date Due    Pneumococcal Vaccines (Age 0-64) (1 of 2 - PCV) Never done    TETANUS VACCINE  Never done    Colorectal Cancer Screening  Never done    LDCT Lung Screen  Never done    Shingles Vaccine (1 of 2) Never done    Influenza Vaccine (1) Never done    COVID-19 Vaccine (2 - 2023-24 season) 09/01/2023         ASSESSMENT & PLAN:   Mr. Adin Copeland is a 58 y.o. male with nicotine dependence presenting for hospital follow up. He is doing well and continues to take oral antibiotics for left orbital cellulitis. Last antibiotic dose on 4/29. He is aware of his upcoming ophthalmology appointment. As he does not have a current PCP recommended he establish care now that he has Medicaid plan in place. Inpatient labs notable for elevated HCV " quant and positive HCV antibody. Discussed importance of follow up with Hep C clinic to start treatment and prevent further damage to liver going forward. Patient understanding of diagnosis and need for treatment.      Hospital discharge follow-up    Hepatitis C virus infection, unspecified chronicity  -     Ambulatory referral/consult to Hepatitis C Clinic; Future; Expected date: 04/29/2024    Orbital cellulitis on left        -  currently taking Bactrim and Augmentin for 14 day course of antibiotic treatment. Follow up with ophthalmology on 4/23/23.      RTC in PRN    Discussed with Dr. Ferrer  - staff attestation to follow        Carlee Amezcua MD, MPH  Internal Medicine, PGY-3  [facility]  [address], [place] [id number] [phone]

## 2024-04-22 NOTE — PATIENT INSTRUCTIONS
Please follow up with ophthalmology appointment tomorrow    Schedule appointment with hepatitis C clinic to start treatment

## 2024-04-22 NOTE — TELEPHONE ENCOUNTER
----- Message from Marisa Gray sent at 4/22/2024 11:31 AM CDT -----  Regarding: SCheduling  Please contact patient regarding an appointment.  Hep C Clinic order placed.     Thanks

## 2024-04-23 ENCOUNTER — OFFICE VISIT (OUTPATIENT)
Dept: OPHTHALMOLOGY | Facility: CLINIC | Age: 59
End: 2024-04-23
Payer: MEDICAID

## 2024-04-23 DIAGNOSIS — H25.13 NUCLEAR SCLEROTIC CATARACT OF BOTH EYES: ICD-10-CM

## 2024-04-23 DIAGNOSIS — H05.012 ORBITAL CELLULITIS ON LEFT: Primary | ICD-10-CM

## 2024-04-23 PROCEDURE — 1160F RVW MEDS BY RX/DR IN RCRD: CPT | Mod: CPTII,,, | Performed by: OPHTHALMOLOGY

## 2024-04-23 PROCEDURE — 1111F DSCHRG MED/CURRENT MED MERGE: CPT | Mod: CPTII,,, | Performed by: OPHTHALMOLOGY

## 2024-04-23 PROCEDURE — 99999 PR PBB SHADOW E&M-EST. PATIENT-LVL I: CPT | Mod: PBBFAC,,, | Performed by: OPHTHALMOLOGY

## 2024-04-23 PROCEDURE — 1159F MED LIST DOCD IN RCRD: CPT | Mod: CPTII,,, | Performed by: OPHTHALMOLOGY

## 2024-04-23 PROCEDURE — 99211 OFF/OP EST MAY X REQ PHY/QHP: CPT | Mod: PBBFAC | Performed by: OPHTHALMOLOGY

## 2024-04-23 PROCEDURE — 3044F HG A1C LEVEL LT 7.0%: CPT | Mod: CPTII,,, | Performed by: OPHTHALMOLOGY

## 2024-04-23 PROCEDURE — 99214 OFFICE O/P EST MOD 30 MIN: CPT | Mod: S$PBB,,, | Performed by: OPHTHALMOLOGY

## 2024-04-23 NOTE — HOSPITAL COURSE
Adin Copeland is a 58 y.o. male with a PMHx of nicotine dependence who presents to the ED as a transfer from [facility] for ophthalmology evaluation for orbital cellulitis with abscess. Labs largely unremarkable CT orbits w/ contrast revealed findings consistent with left orbital cellulitis with a 10 x 4 mm fluid collection anterior to the left lobe suspicious for an abscess. There is no post septal extension. The patient received 1.5g vancomycin and 2g Rocephin prior to transfer. The patient reports he noted mild upper eyelid pain 3 days ago. He took ASA and went to bed. He reports the next morning he noted left eye redness, swelling, and drainage. He reports it worsened today. Denies any trauam to the eye. He denies vision changes, headache, fever, or chills. He denies SOB, CP, N/V/D, abdominal pain, or dysuria. The patient endorses chronic smoker's cough that is unchanged from baseline.     In the ED, VSS, afebrile. CBC unremarkable. Glucose 122. Albumin 3.2. AST/ALT 50/46. CRP 2.5. Hep C Ab reactive. The patient received Toradol. Ophthalmology evaluated the patient and recommended vancomycin and unasyn, repeat CT orbits w/wo contrast tomorrow, and ENT evaluation for possible abscess drainage.    Responded well to IV abx.    Cleared by ENT and Optho for dc.    Will dc home on Augmentin and Bactrim x 2 weeks.

## 2024-04-23 NOTE — DISCHARGE SUMMARY
[facility] - Med Surg ([address] Medicine  Discharge Summary      Patient Name: Adin Copeland  MRN: [id number]  KASHMIR: [id number]  Patient Class: IP- Inpatient  Admission Date: 4/12/2024  Hospital Length of Stay: 3 days  Discharge Date and Time: 4/15/2024 12:02 PM  Attending Physician: Delia att. providers found   Discharging Provider: Tereso Patel MD  Primary Care Provider: Delia Primary Doctor  [facility] Medicine Team: [facility] MED Q Tereso Patel MD  Primary Care Team: [facility]    HPI:   Adin Copeland is a 58 y.o. male with a PMHx of nicotine dependence who presents to the ED as a transfer from [facility] for ophthalmology evaluation for orbital cellulitis with abscess. Labs largely unremarkable CT orbits w/ contrast revealed findings consistent with left orbital cellulitis with a 10 x 4 mm fluid collection anterior to the left lobe suspicious for an abscess. There is no post septal extension. The patient received 1.5g vancomycin and 2g Rocephin prior to transfer. The patient reports he noted mild upper eyelid pain 3 days ago. He took ASA and went to bed. He reports the next morning he noted left eye redness, swelling, and drainage. He reports it worsened today. Denies any trauam to the eye. He denies vision changes, headache, fever, or chills. He denies SOB, CP, N/V/D, abdominal pain, or dysuria. The patient endorses chronic smoker's cough that is unchanged from baseline.    In the ED, VSS, afebrile. CBC unremarkable. Glucose 122. Albumin 3.2. AST/ALT 50/46. CRP 2.5. Hep C Ab reactive. The patient received Toradol. Ophthalmology evaluated the patient and recommended vancomycin and unasyn, repeat CT orbits w/wo contrast tomorrow, and ENT evaluation for possible abscess drainage.    * No surgery found *      Hospital Course:   Adin Copeland is a 58 y.o. male with a PMHx of nicotine dependence who presents to the ED as a transfer from [facility] for ophthalmology evaluation for orbital cellulitis with abscess.  Labs largely unremarkable CT orbits w/ contrast revealed findings consistent with left orbital cellulitis with a 10 x 4 mm fluid collection anterior to the left lobe suspicious for an abscess. There is no post septal extension. The patient received 1.5g vancomycin and 2g Rocephin prior to transfer. The patient reports he noted mild upper eyelid pain 3 days ago. He took ASA and went to bed. He reports the next morning he noted left eye redness, swelling, and drainage. He reports it worsened today. Denies any trauam to the eye. He denies vision changes, headache, fever, or chills. He denies SOB, CP, N/V/D, abdominal pain, or dysuria. The patient endorses chronic smoker's cough that is unchanged from baseline.     In the ED, VSS, afebrile. CBC unremarkable. Glucose 122. Albumin 3.2. AST/ALT 50/46. CRP 2.5. Hep C Ab reactive. The patient received Toradol. Ophthalmology evaluated the patient and recommended vancomycin and unasyn, repeat CT orbits w/wo contrast tomorrow, and ENT evaluation for possible abscess drainage.    Responded well to IV abx.    Cleared by ENT and Optho for dc.    Will dc home on Augmentin and Bactrim x 2 weeks.     Goals of Care Treatment Preferences:  Code Status: Full Code      Consults:   Consults (From admission, onward)          Status Ordering Provider     Inpatient consult to ENT  Once        Provider:  (Not yet assigned)    Completed CINDY GONSALES     Inpatient consult to Ophthalmology  Once        Provider:  (Not yet assigned)    Completed NANCY CENTENO new Assessment & Plan notes have been filed under this hospital service since the last note was generated.  Service: Hospital Medicine    Final Active Diagnoses:    Diagnosis Date Noted POA    PRINCIPAL PROBLEM:  Orbital cellulitis on left [H05.012] 04/12/2024 Yes    Cigarette nicotine dependence without complication [F17.210] 04/12/2024 Yes    Hepatitis C virus infection [B19.20] 04/12/2024 Yes    Elevated LFTs  "[R79.89] 04/12/2024 Yes      Problems Resolved During this Admission:       Discharged Condition: good    Disposition: Home or Self Care    Follow Up:    Patient Instructions:   No discharge procedures on file.    Significant Diagnostic Studies: Labs: BMP: No results for input(s): "GLU", "NA", "K", "CL", "CO2", "BUN", "CREATININE", "CALCIUM", "MG" in the last 48 hours.    Pending Diagnostic Studies:       None           Medications:  Reconciled Home Medications:      Medication List        START taking these medications      amoxicillin-clavulanate 875-125mg 875-125 mg per tablet  Commonly known as: AUGMENTIN  Take 1 tablet by mouth every 12 (twelve) hours. for 14 days     sulfamethoxazole-trimethoprim 800-160mg 800-160 mg Tab  Commonly known as: BACTRIM DS  Take 1 tablet by mouth 2 (two) times daily. for 14 days              Indwelling Lines/Drains at time of discharge:   Lines/Drains/Airways       None                   Time spent on the discharge of patient: 15 minutes         Tereso Patel MD  Department of Hospital Medicine  [facility] Surg ([place] [id number])  "

## 2024-04-23 NOTE — PROGRESS NOTES
HPI    ER F/U Orbital  Cellulitis     C/o: pt states his vision and eye is much better since seen in ER.     Meds: No gtts  Augmentin BID  Bactrim DS BID    Last edited by Barbra Franco MA on 4/23/2024  2:01 PM.            Assessment /Plan     For exam results, see Encounter Report.    Orbital cellulitis on left    Nuclear sclerotic cataract of both eyes        Patient doing well    Construction      + Hep C  Has bains & fu scheduled    Orbital Cellulitis  Resolved    Complete  Augmentin PO BID  Bactrim DS PO BID    NSC OU  CE PRN  Observe      Plan  RTC 4 months establish care with Optometry  RTC sooner prn with good understanding

## 2024-04-24 ENCOUNTER — TELEPHONE (OUTPATIENT)
Dept: INTERNAL MEDICINE | Facility: CLINIC | Age: 59
End: 2024-04-24
Payer: COMMERCIAL

## 2024-04-24 NOTE — TELEPHONE ENCOUNTER
Left message   Need to reschedule appointment on 05/20   With Dr Amezcua  Will need to establish with a new primary doctor   Dr Amezcua is graduating

## 2024-10-24 ENCOUNTER — PATIENT MESSAGE (OUTPATIENT)
Dept: RESEARCH | Facility: HOSPITAL | Age: 59
End: 2024-10-24
Payer: MEDICAID